# Patient Record
Sex: MALE | Race: BLACK OR AFRICAN AMERICAN | NOT HISPANIC OR LATINO | Employment: STUDENT | URBAN - METROPOLITAN AREA
[De-identification: names, ages, dates, MRNs, and addresses within clinical notes are randomized per-mention and may not be internally consistent; named-entity substitution may affect disease eponyms.]

---
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
## 2024-05-09 ENCOUNTER — OFFICE VISIT (OUTPATIENT)
Dept: URGENT CARE | Facility: CLINIC | Age: 19
End: 2024-05-09
Payer: COMMERCIAL

## 2024-05-09 VITALS
DIASTOLIC BLOOD PRESSURE: 75 MMHG | SYSTOLIC BLOOD PRESSURE: 133 MMHG | BODY MASS INDEX: 25.84 KG/M2 | HEART RATE: 59 BPM | WEIGHT: 175 LBS | OXYGEN SATURATION: 99 %

## 2024-05-09 DIAGNOSIS — H10.12 ALLERGIC CONJUNCTIVITIS OF LEFT EYE: Primary | ICD-10-CM

## 2024-05-09 DIAGNOSIS — Z88.9 HX OF SEASONAL ALLERGIES: ICD-10-CM

## 2024-05-09 PROCEDURE — S9088 SERVICES PROVIDED IN URGENT: HCPCS

## 2024-05-09 PROCEDURE — 99213 OFFICE O/P EST LOW 20 MIN: CPT

## 2024-05-09 RX ORDER — FLUTICASONE PROPIONATE 50 MCG
2 SPRAY, SUSPENSION (ML) NASAL DAILY
Qty: 18.2 ML | Refills: 0 | Status: SHIPPED | OUTPATIENT
Start: 2024-05-09

## 2024-05-09 RX ORDER — AZELASTINE HYDROCHLORIDE 0.5 MG/ML
1 SOLUTION/ DROPS OPHTHALMIC 2 TIMES DAILY PRN
Qty: 6 ML | Refills: 0 | Status: SHIPPED | OUTPATIENT
Start: 2024-05-09

## 2024-05-09 NOTE — PROGRESS NOTES
[facility]        NAME: Geoff Velez is a 19 y.o. male  : 2005    MRN: [id number]  DATE: May 9, 2024  TIME: 7:39 PM    Assessment and Plan   Allergic conjunctivitis of left eye [H10.12]  1. Allergic conjunctivitis of left eye  azelastine (OPTIVAR) 0.05 % ophthalmic solution      2. Hx of seasonal allergies  fluticasone (FLONASE) 50 mcg/act nasal spray        Work note given.     Patient Instructions     Optivar eye drops as needed for itchy eyes.  Flonase nasal spray.  Zyrtec daily.  Cool compresses to eyes.     Follow up with PCP in 3-5 days.  Proceed to the ER with worsening symptoms.     Chief Complaint     Chief Complaint   Patient presents with    Eye Problem     Pt is here for left eye pain and redness. States that he would wake up with crusty eyes for the past 2 days. Needs a doctors note.   He is also asking for a refill on his allergy medication         History of Present Illness       The patient presents today with complaints of intermittent itchy eyes, tearing, crustiness especially to the L eye x a few days. He has a history of seasonal allergies and has been taking zyrtec. Denies fever/chills, cough/congestion. Does not wear contacts.         Review of Systems   Review of Systems   Constitutional:  Negative for chills and fever.   HENT:  Negative for congestion, postnasal drip, rhinorrhea, sinus pressure, sinus pain and sore throat.    Eyes:  Positive for discharge, redness and itching. Negative for photophobia, pain and visual disturbance.        L eye greater than R   Respiratory:  Negative for cough.          Current Medications       Current Outpatient Medications:     azelastine (OPTIVAR) 0.05 % ophthalmic solution, Apply 1 drop to eye 2 (two) times a day as needed (itchy eyes), Disp: 6 mL, Rfl: 0    fluticasone (FLONASE) 50 mcg/act nasal spray, 2 sprays into each nostril daily, Disp: 18.2 mL, Rfl: 0    albuterol (ProAir HFA) 90 mcg/act inhaler, Inhale 2 puffs every 6 (six)  hours as needed for wheezing, Disp: 8.5 g, Rfl: 0    albuterol (PROVENTIL HFA,VENTOLIN HFA) 90 mcg/act inhaler, Inhale 2 puffs every 6 (six) hours as needed for wheezing, Disp: , Rfl:     Cetirizine HCl 10 MG CAPS, Take 1 capsule (10 mg total) by mouth daily, Disp: 30 capsule, Rfl: 0    loratadine (CLARITIN) 10 mg tablet, Take 10 mg by mouth daily (Patient not taking: Reported on 11/30/2022), Disp: , Rfl:     loratadine (CLARITIN) 10 mg tablet, Take 1 tablet (10 mg total) by mouth daily, Disp: 30 tablet, Rfl: 0    neomycin-polymyxin-hydrocortisone (CORTISPORIN) 0.35%-10,000 units/mL-1% ophthalmic suspension, Administer 1 drop to both eyes 3 (three) times a day for 7 days, Disp: 7.5 mL, Rfl: 0    Current Allergies     Allergies as of 05/09/2024    (No Known Allergies)            The following portions of the patient's history were reviewed and updated as appropriate: allergies, current medications, past family history, past medical history, past social history, past surgical history and problem list.     Past Medical History:   Diagnosis Date    Asthma        History reviewed. No pertinent surgical history.    Family History   Problem Relation Age of Onset    Asthma Mother     Asthma Father          Medications have been verified.        Objective   /75   Pulse 59   Wt 79.4 kg (175 lb)   SpO2 99%   BMI 25.84 kg/m²        Physical Exam     Physical Exam  Vitals and nursing note reviewed.   Constitutional:       General: He is not in acute distress.     Appearance: Normal appearance.   HENT:      Head: Normocephalic and atraumatic.      Right Ear: External ear normal.      Left Ear: External ear normal.      Mouth/Throat:      Mouth: Mucous membranes are moist.   Eyes:      Conjunctiva/sclera:      Right eye: Right conjunctiva is not injected. No chemosis, exudate or hemorrhage.     Left eye: Left conjunctiva is not injected. No chemosis, exudate or hemorrhage.     Pupils: Pupils are equal, round, and  reactive to light.   Cardiovascular:      Rate and Rhythm: Normal rate.   Pulmonary:      Effort: Pulmonary effort is normal.   Skin:     General: Skin is warm and dry.   Neurological:      Mental Status: He is alert and oriented to person, place, and time. Mental status is at baseline.   Psychiatric:         Mood and Affect: Mood normal.         Behavior: Behavior normal.

## 2024-05-09 NOTE — PATIENT INSTRUCTIONS
Optivar eye drops as needed for itchy eyes.  Flonase nasal spray.  Zyrtec daily.  Cool compresses to eyes.     Follow up with PCP in 3-5 days.  Proceed to the ER with worsening symptoms.

## 2024-05-09 NOTE — LETTER
May 9, 2024     Patient: Geoff Velez   YOB: 2005   Date of Visit: 5/9/2024       To Whom it May Concern:    Geoff Velez was seen in my clinic on 5/9/2024. He may return to work on 5/10/2024 .    If you have any questions or concerns, please don't hesitate to call.         Sincerely,          WESLEY Wetzel        CC: No Recipients

## 2024-06-07 DIAGNOSIS — Z88.9 HX OF SEASONAL ALLERGIES: ICD-10-CM

## 2024-06-07 RX ORDER — FLUTICASONE PROPIONATE 50 MCG
SPRAY, SUSPENSION (ML) NASAL
Qty: 16 ML | OUTPATIENT
Start: 2024-06-07

## 2024-07-07 ENCOUNTER — HOSPITAL ENCOUNTER (EMERGENCY)
Facility: HOSPITAL | Age: 19
Discharge: HOME | End: 2024-07-07
Attending: EMERGENCY MEDICINE
Payer: COMMERCIAL

## 2024-07-07 VITALS
SYSTOLIC BLOOD PRESSURE: 152 MMHG | RESPIRATION RATE: 18 BRPM | OXYGEN SATURATION: 98 % | HEIGHT: 69 IN | WEIGHT: 161.82 LBS | BODY MASS INDEX: 23.97 KG/M2 | DIASTOLIC BLOOD PRESSURE: 65 MMHG | TEMPERATURE: 98 F | HEART RATE: 58 BPM

## 2024-07-07 DIAGNOSIS — S51.812A FOREARM LACERATION, LEFT, INITIAL ENCOUNTER: Primary | ICD-10-CM

## 2024-07-07 PROCEDURE — 99283 EMERGENCY DEPT VISIT LOW MDM: CPT

## 2024-07-07 PROCEDURE — 90471 IMMUNIZATION ADMIN: CPT

## 2024-07-07 PROCEDURE — 99284 EMERGENCY DEPT VISIT MOD MDM: CPT | Performed by: EMERGENCY MEDICINE

## 2024-07-07 PROCEDURE — 12032 INTMD RPR S/A/T/EXT 2.6-7.5: CPT | Performed by: EMERGENCY MEDICINE

## 2024-07-07 PROCEDURE — 90715 TDAP VACCINE 7 YRS/> IM: CPT | Performed by: EMERGENCY MEDICINE

## 2024-07-07 RX ORDER — CEPHALEXIN 500 MG/1
500 CAPSULE ORAL EVERY 6 HOURS SCHEDULED
Qty: 28 CAPSULE | Refills: 0 | Status: SHIPPED | OUTPATIENT
Start: 2024-07-07 | End: 2024-07-14

## 2024-07-07 RX ORDER — IBUPROFEN 600 MG/1
600 TABLET ORAL ONCE
Status: COMPLETED | OUTPATIENT
Start: 2024-07-07 | End: 2024-07-07

## 2024-07-07 RX ORDER — LIDOCAINE HYDROCHLORIDE AND EPINEPHRINE 10; 10 MG/ML; UG/ML
1 INJECTION, SOLUTION INFILTRATION; PERINEURAL ONCE
Status: COMPLETED | OUTPATIENT
Start: 2024-07-07 | End: 2024-07-07

## 2024-07-07 RX ORDER — BACITRACIN, NEOMYCIN, POLYMYXIN B 400; 3.5; 5 [USP'U]/G; MG/G; [USP'U]/G
1 OINTMENT TOPICAL ONCE
Status: CANCELLED | OUTPATIENT
Start: 2024-07-07 | End: 2024-07-07

## 2024-07-07 RX ORDER — GINSENG 100 MG
1 CAPSULE ORAL ONCE
Status: COMPLETED | OUTPATIENT
Start: 2024-07-07 | End: 2024-07-07

## 2024-07-07 RX ADMIN — TETANUS TOXOID, REDUCED DIPHTHERIA TOXOID AND ACELLULAR PERTUSSIS VACCINE, ADSORBED 0.5 ML: 5; 2.5; 8; 8; 2.5 SUSPENSION INTRAMUSCULAR at 05:44

## 2024-07-07 RX ADMIN — IBUPROFEN 600 MG: 600 TABLET, FILM COATED ORAL at 05:43

## 2024-07-07 RX ADMIN — LIDOCAINE HYDROCHLORIDE,EPINEPHRINE BITARTRATE 1 ML: 10; .01 INJECTION, SOLUTION INFILTRATION; PERINEURAL at 05:45

## 2024-07-07 RX ADMIN — BACITRACIN 1 SMALL APPLICATION: 500 OINTMENT TOPICAL at 05:57

## 2024-07-07 NOTE — Clinical Note
Mook Dickinson was seen and treated in our emergency department on 7/7/2024.                Diagnosis: Laceration R arm    Mook  .    He may return on this date:     Off work 7/7/24  Limited lifting / movement with R arm x 1 week (<15 lbs)     If you have any questions or concerns, please don't hesitate to call.      Keith Gilbert MD    ______________________________           _______________          _______________  Hospital Representative                              Date                                Time

## 2024-07-07 NOTE — DISCHARGE INSTRUCTIONS
Stitches out in 12 days    A  personal message from Dr. Keith Gilbert,  Thank you so much for allowing me to care for you today.    I pride myself in the care and attention I give all my patients.  I hope you were a witness to this tonight.   If for any reason your condition does not improve or worsens, or you have a question that was not answered during your visit you can feel free to text me on my personal phone #  # [phone].   I will answer to your message and continue your care past your emergency room visit.     Please understand that although you are being discharged because your condition has been deemed stable and able to be managed on an outpatient setting. However your condition may worsen as part of the natural progression of the illness/condition, if this occurs please come back to the emergency department for a repeat evaluation.

## 2024-07-08 NOTE — ED PROVIDER NOTES
History  Chief Complaint   Patient presents with    Extremity Laceration     Pt BIBA c/o R arm laceration about 4-5 cm after punching a window during argument with brother around 0330 this morning. Bleeding is currently controlled. Last tetanus vaccine unknown.      Mook Dickinson is a 19 y.o.  year old male  No past medical history on file.    Patient presents with:  Extremity Laceration: Pt BIBA c/o R arm laceration about 4-5 cm after punching a window during argument with brother around 0330 this morning.   Moderate constant pain  Bleeding is currently controlled. Last tetanus vaccine unknown.       History obtained directly from the PATIENT              History provided by:  Patient   used: No        None       No past medical history on file.    No past surgical history on file.    No family history on file.  I have reviewed and agree with the history as documented.    No existing history information found.  No existing history information found.       Review of Systems   Constitutional:  Negative for chills and fever.   HENT:  Negative for ear pain and sore throat.    Eyes:  Negative for pain and visual disturbance.   Respiratory:  Negative for cough and shortness of breath.    Cardiovascular:  Negative for chest pain and palpitations.   Gastrointestinal:  Negative for abdominal pain and vomiting.   Genitourinary:  Negative for dysuria and hematuria.   Musculoskeletal:  Negative for arthralgias and back pain.   Skin:  Positive for wound. Negative for color change and rash.   Neurological:  Negative for seizures and syncope.   All other systems reviewed and are negative.      Physical Exam  Physical Exam  Vitals reviewed.   Constitutional:       General: He is not in acute distress.     Appearance: Normal appearance. He is not ill-appearing.   HENT:      Head: Atraumatic.      Right Ear: External ear normal.      Left Ear: External ear normal.      Mouth/Throat:      Mouth: Mucous membranes  are moist.   Eyes:      Pupils: Pupils are equal, round, and reactive to light.   Cardiovascular:      Rate and Rhythm: Normal rate.   Pulmonary:      Effort: Pulmonary effort is normal.   Skin:     General: Skin is warm and dry.      Comments: Patient has a deep laceration of the forearm 6.2 cm in length.  Distal neurovascular exam is intact.  Function of the digits is normal.  Forearm flexion extension is normal.    Procedure note: Performed by me laceration repair.  After irrigating the wound and numbing with lidocaine with epinephrine by local infiltration.  Wound was explored.  It was deep to the fascia and muscle layers.  The fascia was closed with a continuous stitching with Vicryl 4-0  Then the superficial layers were closed with 2 horizontal mattresses to provide wound with strength and Deivert some of the tension from the edges.  And then interlocking continuous stitches were done on the surface with Ethilon 4-0 x 12.  No complications and tolerated well   Neurological:      General: No focal deficit present.      Mental Status: He is alert and oriented to person, place, and time.   Psychiatric:         Mood and Affect: Mood normal.         Thought Content: Thought content normal.     (RIGHT FOREARM)    Vital Signs  ED Triage Vitals   Temperature Pulse Respirations Blood Pressure SpO2   07/07/24 0506 07/07/24 0506 07/07/24 0506 07/07/24 0506 07/07/24 0506   98 °F (36.7 °C) 58 18 152/65 98 %      Temp Source Heart Rate Source Patient Position - Orthostatic VS BP Location FiO2 (%)   07/07/24 0506 07/07/24 0506 07/07/24 0506 07/07/24 0506 --   Oral Monitor Sitting Left arm       Pain Score       07/07/24 0543       5           Vitals:    07/07/24 0506   BP: 152/65   Pulse: 58   Patient Position - Orthostatic VS: Sitting         Visual Acuity      ED Medications  Medications   lidocaine-epinephrine (XYLOCAINE/EPINEPHRINE) 1 %-1:100,000 injection 1 mL (1 mL Infiltration Given by Other 7/7/24 4545)   ibuprofen  "(MOTRIN) tablet 600 mg (600 mg Oral Given 7/7/24 0543)   tetanus-diphtheria-acellular pertussis (BOOSTRIX) IM injection 0.5 mL (0.5 mL Intramuscular Given 7/7/24 0544)   bacitracin topical ointment 1 small application (1 small application Topical Given 7/7/24 0557)       Diagnostic Studies  Results Reviewed       None                   No orders to display              Procedures  Procedures         ED Course         CRAFFT      Flowsheet Row Most Recent Value   CRAFFT Initial Screen: During the past 12 months, did you:    1. Drink any alcohol (more than a few sips)?  No Filed at: 07/07/2024 0508   2. Smoke any marijuana or hashish No Filed at: 07/07/2024 0508   3. Use anything else to get high? (\"anything else\" includes illegal drugs, over the counter and prescription drugs, and things that you sniff or 's[place]iego')? No Filed at: 07/07/2024 0508                                            Medical Decision Making  Problems Addressed:  Forearm laceration, left, initial encounter: acute illness or injury    Amount and/or Complexity of Data Reviewed  Discussion of management or test interpretation with external provider(s): Patient clinical presentation is benign.    Meaning patient's vital signs are normal and stable ED Triage Vitals  Temperature: 98 °F (36.7 °C) [07/07/24 0506]  Pulse: 58 [07/07/24 0506]  Respirations: 18 [07/07/24 0506]  Blood Pressure: 152/65 [07/07/24 0506]  SpO2: 98 % [07/07/24 0506]  Temp Source: Oral [07/07/24 0506]  Heart Rate Source: Monitor [07/07/24 0506]  Patient Position - Orthostatic VS: Sitting [07/07/24 0506]  BP Location: Left arm [07/07/24 0506]  FiO2 (%): n/a  Pain Score: 5 [07/07/24 0543].    Patient in no distress.    Chief complaint, vital signs, physical examination does not suggest an acute medical emergency at this time.       Risk  OTC drugs.  Prescription drug management.             Disposition  Final diagnoses:   Forearm laceration, left, initial encounter     Time reflects when " diagnosis was documented in both MDM as applicable and the Disposition within this note       Time User Action Codes Description Comment    7/7/2024  5:38 AM Keith Gilbert Add [S51.812A] Forearm laceration, left, initial encounter           ED Disposition       ED Disposition   Discharge    Condition   Stable    Date/Time   Sun Jul 7, 2024 0538    Comment   Mook Alok discharge to home/self care.                   Follow-up Information    None         Discharge Medication List as of 7/7/2024  5:44 AM        START taking these medications    Details   cephalexin (KEFLEX) 500 mg capsule Take 1 capsule (500 mg total) by mouth every 6 (six) hours for 7 days, Starting Sun 7/7/2024, Until Sun 7/14/2024, Print             No discharge procedures on file.    PDMP Review       None            ED Provider  Electronically Signed by             Keith Giblert MD  07/08/24 0009       Keith Gilbert MD  07/09/24 0430

## 2024-10-03 ENCOUNTER — HOSPITAL ENCOUNTER (EMERGENCY)
Facility: HOSPITAL | Age: 19
Discharge: HOME | End: 2024-10-03
Attending: EMERGENCY MEDICINE
Payer: COMMERCIAL

## 2024-10-03 VITALS
SYSTOLIC BLOOD PRESSURE: 122 MMHG | OXYGEN SATURATION: 100 % | DIASTOLIC BLOOD PRESSURE: 62 MMHG | HEART RATE: 83 BPM | HEIGHT: 69 IN | TEMPERATURE: 98 F | BODY MASS INDEX: 23.22 KG/M2 | WEIGHT: 156.75 LBS | RESPIRATION RATE: 18 BRPM

## 2024-10-03 DIAGNOSIS — R09.81 NASAL CONGESTION: ICD-10-CM

## 2024-10-03 DIAGNOSIS — B34.9 ACUTE VIRAL SYNDROME: ICD-10-CM

## 2024-10-03 DIAGNOSIS — R52 GENERALIZED BODY ACHES: ICD-10-CM

## 2024-10-03 DIAGNOSIS — H10.9 CONJUNCTIVITIS, LEFT EYE: Primary | ICD-10-CM

## 2024-10-03 DIAGNOSIS — J34.89 RHINORRHEA: ICD-10-CM

## 2024-10-03 DIAGNOSIS — J02.9 ACUTE SORE THROAT: ICD-10-CM

## 2024-10-03 LAB
FLUAV AG UPPER RESP QL IA.RAPID: NEGATIVE
FLUBV AG UPPER RESP QL IA.RAPID: NEGATIVE
S PYO DNA THROAT QL NAA+PROBE: NOT DETECTED
SARS-COV+SARS-COV-2 AG RESP QL IA.RAPID: NEGATIVE

## 2024-10-03 PROCEDURE — 99284 EMERGENCY DEPT VISIT MOD MDM: CPT | Performed by: EMERGENCY MEDICINE

## 2024-10-03 PROCEDURE — 99283 EMERGENCY DEPT VISIT LOW MDM: CPT

## 2024-10-03 PROCEDURE — 87811 SARS-COV-2 COVID19 W/OPTIC: CPT | Performed by: EMERGENCY MEDICINE

## 2024-10-03 PROCEDURE — 87651 STREP A DNA AMP PROBE: CPT | Performed by: EMERGENCY MEDICINE

## 2024-10-03 PROCEDURE — 87804 INFLUENZA ASSAY W/OPTIC: CPT | Performed by: EMERGENCY MEDICINE

## 2024-10-03 RX ORDER — DEXAMETHASONE 4 MG/1
8 TABLET ORAL ONCE
Qty: 2 TABLET | Refills: 0 | Status: SHIPPED | OUTPATIENT
Start: 2024-10-05 | End: 2024-10-05

## 2024-10-03 RX ORDER — ALBUTEROL SULFATE 90 UG/1
2 INHALANT RESPIRATORY (INHALATION) EVERY 6 HOURS PRN
Qty: 18 G | Refills: 0 | Status: SHIPPED | OUTPATIENT
Start: 2024-10-03

## 2024-10-03 RX ORDER — IBUPROFEN 600 MG/1
600 TABLET, FILM COATED ORAL ONCE
Status: COMPLETED | OUTPATIENT
Start: 2024-10-03 | End: 2024-10-03

## 2024-10-03 RX ORDER — OFLOXACIN 3 MG/ML
2 SOLUTION/ DROPS OPHTHALMIC ONCE
Status: COMPLETED | OUTPATIENT
Start: 2024-10-03 | End: 2024-10-03

## 2024-10-03 RX ADMIN — DEXAMETHASONE SODIUM PHOSPHATE 10 MG: 10 INJECTION, SOLUTION INTRAMUSCULAR; INTRAVENOUS at 21:47

## 2024-10-03 RX ADMIN — OFLOXACIN 2 DROP: 3 SOLUTION/ DROPS OPHTHALMIC at 21:47

## 2024-10-03 RX ADMIN — IBUPROFEN 600 MG: 600 TABLET, FILM COATED ORAL at 21:47

## 2024-10-03 NOTE — Clinical Note
Mook Dickinson was seen and treated in our emergency department on 10/3/2024.                Diagnosis: viral syndrome    Mook  .    He may return on this date: 10/04/2024         If you have any questions or concerns, please don't hesitate to call.      Karri Moreno MD    ______________________________           _______________          _______________  Hospital Representative                              Date                                Time

## 2024-10-03 NOTE — Clinical Note
Mook Dickinson was seen and treated in our emergency department on 10/3/2024.                Diagnosis: viral syndrome    Mook  may return to work on return date.    He may return on this date: 10/06/2024    Symptoms on 10/2 presenting for evaluation and treatment 10/3     If you have any questions or concerns, please don't hesitate to call.      Karri Moreno MD    ______________________________           _______________          _______________  Hospital Representative                              Date                                Time

## 2024-10-04 NOTE — ED PROVIDER NOTES
Final diagnoses:   Conjunctivitis, left eye   Acute viral syndrome   Generalized body aches   Acute sore throat   Nasal congestion   Rhinorrhea     ED Disposition       ED Disposition   Discharge    Condition   Stable    Date/Time   Thu Oct 3, 2024  9:43 PM    Comment   Mook Dickinson discharge to home/self care.                   Assessment & Plan       Medical Decision Making  19-year-old male history of asthma presenting with viral syndrome.  Constellation of symptoms consistent with viral syndrome.  Plan for symptom management with oral medications.  Will provide ocular antibiotic drops for suspected bacterial conjunctivitis.  Work note.  Infectious precautions. Discussed results and recommendations. Advised follow up PCP. Medication recommendations. Given instructions and return precautions. Patient/family at bedside acknowledged understanding of all written and verbal instructions and return precautions. Discharged.     Amount and/or Complexity of Data Reviewed  Labs: ordered.    Risk  Prescription drug management.             Medications   dexamethasone oral liquid 10 mg 1 mL (10 mg Oral Given 10/3/24 2147)   ibuprofen (MOTRIN) tablet 600 mg (600 mg Oral Given 10/3/24 2147)   ofloxacin (OCUFLOX) 0.3 % ophthalmic solution 2 drop (2 drops Left Eye Given 10/3/24 2147)       ED Risk Strat Scores                                               History of Present Illness       Chief Complaint   Patient presents with    Flu Symptoms     Pt reports feeling unwell x 1 week. + generalized body aches and headache. Intermittent nausea. Denies fevers at home. + cough. + sick contacts. + sore throat, pain with swallowing.        Past Medical History:   Diagnosis Date    Asthma       History reviewed. No pertinent surgical history.   History reviewed. No pertinent family history.   Social History     Tobacco Use    Smoking status: Never    Smokeless tobacco: Never   Vaping Use    Vaping status: Every Day   Substance Use Topics     Alcohol use: Yes    Drug use: Yes     Types: Marijuana      E-Cigarette/Vaping    E-Cigarette Use Current Every Day User     Comments vape       E-Cigarette/Vaping Substances      I have reviewed and agree with the history as documented.     19-year-old male history of asthma presenting with viral syndrome.  Patient reports about a week of viral symptoms including nasal congestion, rhinorrhea, sore throat, generalized bodyaches, nonproductive cough.  Patient also reports that his asthma has been acting up and has had to use his inhaler a couple times this week.  Denies any current shortness of breath or wheezing.  Denies any chest pain.  Denies any abdominal pain nausea vomiting diarrhea.  Reports left eye irritation and redness with discharge.  Concern for pinkeye.  Does wear contacts but does not currently wearing them.  Denies any other complaints.  Chart reviewed.    Past Medical History:  No date: Asthma  Family History: non-contributory  Social History          Review of Systems   Constitutional:  Positive for fatigue. Negative for appetite change, chills, diaphoresis, fever and unexpected weight change.   HENT:  Positive for congestion, rhinorrhea and sore throat.    Eyes:  Positive for pain, discharge and redness. Negative for photophobia and visual disturbance.   Respiratory:  Positive for cough. Negative for chest tightness and shortness of breath.    Cardiovascular:  Negative for chest pain, palpitations and leg swelling.   Gastrointestinal:  Negative for abdominal distention, abdominal pain, blood in stool, constipation, diarrhea, nausea and vomiting.   Genitourinary:  Negative for dysuria and hematuria.   Musculoskeletal:  Positive for myalgias. Negative for back pain, joint swelling, neck pain and neck stiffness.   Skin:  Negative for color change, pallor, rash and wound.   Neurological:  Negative for dizziness, syncope, weakness, light-headedness and headaches.   Psychiatric/Behavioral:  Negative  for agitation.    All other systems reviewed and are negative.          Objective       ED Triage Vitals [10/03/24 2050]   Temperature Pulse Blood Pressure Respirations SpO2 Patient Position - Orthostatic VS   98 °F (36.7 °C) 83 122/62 18 100 % Sitting      Temp Source Heart Rate Source BP Location FiO2 (%) Pain Score    Temporal Monitor Left arm -- --      Vitals      Date and Time Temp Pulse SpO2 Resp BP Pain Score FACES Pain Rating User   10/03/24 2050 98 °F (36.7 °C) 83 100 % 18 122/62 -- -- RO            Physical Exam  Vitals and nursing note reviewed.   Constitutional:       General: He is not in acute distress.     Appearance: Normal appearance. He is well-developed. He is ill-appearing. He is not toxic-appearing or diaphoretic.   HENT:      Head: Normocephalic and atraumatic.      Nose: Congestion and rhinorrhea present.      Mouth/Throat:      Mouth: Mucous membranes are moist.      Pharynx: Oropharynx is clear. No oropharyngeal exudate or posterior oropharyngeal erythema.   Eyes:      General: No scleral icterus.        Right eye: No discharge.         Left eye: No discharge.      Extraocular Movements: Extraocular movements intact.      Pupils: Pupils are equal, round, and reactive to light.      Comments: Left eye conjunctival injection   Neck:      Vascular: No JVD.      Trachea: No tracheal deviation.      Comments: Supple. Normal range of motion.   Cardiovascular:      Rate and Rhythm: Normal rate and regular rhythm.      Heart sounds: Normal heart sounds. No murmur heard.     No friction rub. No gallop.      Comments: Normal rate and regular rhythm  Pulmonary:      Effort: Pulmonary effort is normal. No respiratory distress.      Breath sounds: Normal breath sounds. No stridor. No wheezing or rales.      Comments: Clear to auscultation bilaterally  Chest:      Chest wall: No tenderness.   Abdominal:      General: Bowel sounds are normal. There is no distension.      Palpations: Abdomen is soft.       Tenderness: There is no abdominal tenderness. There is no right CVA tenderness, left CVA tenderness, guarding or rebound.      Comments: Soft, nontender, nondistended.  Normal bowel sounds throughout   Musculoskeletal:         General: No swelling, tenderness, deformity or signs of injury. Normal range of motion.      Cervical back: Normal range of motion and neck supple. No rigidity. No muscular tenderness.      Right lower leg: No edema.      Left lower leg: No edema.   Lymphadenopathy:      Cervical: No cervical adenopathy.   Skin:     General: Skin is warm and dry.      Coloration: Skin is not pale.      Findings: No erythema or rash.   Neurological:      General: No focal deficit present.      Mental Status: He is alert. Mental status is at baseline.      Sensory: No sensory deficit.      Motor: No weakness or abnormal muscle tone.      Coordination: Coordination normal.      Gait: Gait normal.      Comments: Alert.  Strength and sensation grossly intact.  Ambulatory without difficulty at baseline.    Psychiatric:         Behavior: Behavior normal.         Thought Content: Thought content normal.         Results Reviewed       Procedure Component Value Units Date/Time    Strep A PCR [[id number]]  (Normal) Collected: 10/03/24 2054    Lab Status: Final result Specimen: Throat Updated: 10/03/24 2124     STREP A PCR Not Detected    FLU/COVID Rapid Antigen (30 min. TAT) - Preferred screening test in ED [[id number]]  (Normal) Collected: 10/03/24 2054    Lab Status: Final result Specimen: Nares from Nose Updated: 10/03/24 2116     SARS COV Rapid Antigen Negative     Influenza A Rapid Antigen Negative     Influenza B Rapid Antigen Negative    Narrative:      This test has been performed using the Quidel Kayy 2 FLU+SARS Antigen test under the Emergency Use Authorization (EUA). This test has been validated by the  and verified by the performing laboratory. The Kayy uses lateral flow immunofluorescent sandwich  assay to detect SARS-COV, Influenza A and Influenza B Antigen.     The Quidel Kayy 2 SARS Antigen test does not differentiate between SARS-CoV and SARS-CoV-2.     Negative results are presumptive and may be confirmed with a molecular assay, if necessary, for patient management. Negative results do not rule out SARS-CoV-2 or influenza infection and should not be used as the sole basis for treatment or patient management decisions. A negative test result may occur if the level of antigen in a sample is below the limit of detection of this test.     Positive results are indicative of the presence of viral antigens, but do not rule out bacterial infection or co-infection with other viruses.     All test results should be used as an adjunct to clinical observations and other information available to the provider.    FOR PEDIATRIC PATIENTS - copy/paste COVID Guidelines URL to browser: [email or web address]            No orders to display       Procedures    ED Medication and Procedure Management   None     Discharge Medication List as of 10/3/2024 10:04 PM        START taking these medications    Details   albuterol (ProAir HFA) 90 mcg/act inhaler Inhale 2 puffs every 6 (six) hours as needed for shortness of breath or wheezing, Starting Thu 10/3/2024, Normal      dexamethasone (DECADRON) 4 mg tablet Take 2 tablets (8 mg total) by mouth 1 (one) time for 1 dose Do not start before October 5, 2024., Starting Sat 10/5/2024, Normal           No discharge procedures on file.  ED SEPSIS DOCUMENTATION   Time reflects when diagnosis was documented in both MDM as applicable and the Disposition within this note       Time User Action Codes Description Comment    10/3/2024  9:44 PM ErneKarri Add [H10.9] Conjunctivitis, left eye     10/3/2024  9:44 PM Erne Karri Add [B34.9] Acute viral syndrome     10/3/2024  9:45 PM ErnePhann Add [R52] Generalized body aches     10/3/2024  10:17 PM Karri Moreno [J02.9] Acute sore throat     10/3/2024 10:17 PM Karri Moreno [R09.81] Nasal congestion     10/3/2024 10:17 PM Karri Moreno [J34.89] Rhinorrhea                  Karri Moreno MD  10/03/24 2216       Karri Moreno MD  10/03/24 2217

## 2024-10-18 ENCOUNTER — APPOINTMENT (OUTPATIENT)
Dept: RADIOLOGY | Facility: CLINIC | Age: 19
End: 2024-10-18
Payer: COMMERCIAL

## 2024-10-18 ENCOUNTER — OFFICE VISIT (OUTPATIENT)
Dept: URGENT CARE | Facility: CLINIC | Age: 19
End: 2024-10-18
Payer: COMMERCIAL

## 2024-10-18 VITALS
TEMPERATURE: 97.6 F | SYSTOLIC BLOOD PRESSURE: 147 MMHG | RESPIRATION RATE: 18 BRPM | OXYGEN SATURATION: 96 % | DIASTOLIC BLOOD PRESSURE: 75 MMHG | HEART RATE: 96 BPM | BODY MASS INDEX: 22.77 KG/M2 | WEIGHT: 154.2 LBS

## 2024-10-18 DIAGNOSIS — R06.02 SOB (SHORTNESS OF BREATH): ICD-10-CM

## 2024-10-18 DIAGNOSIS — H65.02 NON-RECURRENT ACUTE SEROUS OTITIS MEDIA OF LEFT EAR: ICD-10-CM

## 2024-10-18 DIAGNOSIS — J45.41 MODERATE PERSISTENT ASTHMA WITH EXACERBATION: Primary | ICD-10-CM

## 2024-10-18 PROCEDURE — 99214 OFFICE O/P EST MOD 30 MIN: CPT | Performed by: NURSE PRACTITIONER

## 2024-10-18 PROCEDURE — S9088 SERVICES PROVIDED IN URGENT: HCPCS | Performed by: NURSE PRACTITIONER

## 2024-10-18 PROCEDURE — 71046 X-RAY EXAM CHEST 2 VIEWS: CPT

## 2024-10-18 RX ORDER — PREDNISONE 20 MG/1
40 TABLET ORAL DAILY
Qty: 10 TABLET | Refills: 0 | Status: SHIPPED | OUTPATIENT
Start: 2024-10-18 | End: 2024-10-23

## 2024-10-18 NOTE — LETTER
October 18, 2024     Patient: Geoff Velez   YOB: 2005   Date of Visit: 10/18/2024       To Whom it May Concern:    Geoff Velez was seen in my clinic on 10/18/2024. He may return to work on 10/22/2024 .    If you have any questions or concerns, please don't hesitate to call.         Sincerely,          WESLEY Hutchinson        CC: No Recipients

## 2024-10-18 NOTE — PROGRESS NOTES
[facility]        NAME: Geoff Velez is a 19 y.o. male  : 2005    MRN: [id number]  DATE: 2024  TIME: 12:43 PM    Assessment and Plan   Moderate persistent asthma with exacerbation [J45.41]  1. Moderate persistent asthma with exacerbation  predniSONE 20 mg tablet      2. SOB (shortness of breath)  XR chest pa and lateral      3. Non-recurrent acute serous otitis media of left ear  amoxicillin-clavulanate (AUGMENTIN) 875-125 mg per tablet        Xray reviewed and no acute pneumonia was noted. Pending final read from radiology.  Advised patient he needs to take his Flovent inhaler daily as directed as this is the maintenance medication to prevent flare ups. Albuterol inhaler should be used as needed only. Will treat with PO prednisone x 5 days. Also start Augmentin as directed for otitis media of left ear. Follow up with PCP for asthma control.     Patient Instructions       Follow up with PCP in 3-5 days.  Proceed to  ER if symptoms worsen.    If tests are performed, our office will contact you with results only if changes need to made to the care plan discussed with you at the visit. [facility]    Chief Complaint     Chief Complaint   Patient presents with    Cough     Patient here with cough, chest tightness and right ear pain for a few days now. Mother states this is an intermittent issue so she is requesting him to get a chest xray.          History of Present Illness       Was seen at urgent care one month ago and treated with oral steroids. Has used nebulizer treatments intermittently, using albuterol inhaler about 3 times per week. Not taking any allergy medication. Mom states he has Flovent inhaler but does not use. Mom would like a chest xray as this has been an ongoing problem for a month or so.     Asthma  He complains of chest tightness, cough, difficulty breathing, shortness of breath and wheezing. There is no frequent throat  clearing, hemoptysis, hoarse voice or sputum production. This is a recurrent problem. The current episode started more than 1 month ago. The problem occurs constantly. The problem has been waxing and waning. The cough is non-productive. Associated symptoms include dyspnea on exertion, ear congestion, ear pain, myalgias and a sore throat. Pertinent negatives include no appetite change, chest pain, fever, headaches, heartburn, malaise/fatigue, nasal congestion, orthopnea, PND, postnasal drip, rhinorrhea, sneezing, sweats, trouble swallowing or weight loss. His past medical history is significant for asthma.       Review of Systems   Review of Systems   Constitutional:  Negative for appetite change, fatigue, fever, malaise/fatigue and weight loss.   HENT:  Positive for ear pain and sore throat. Negative for hoarse voice, postnasal drip, rhinorrhea, sneezing and trouble swallowing.    Respiratory:  Positive for cough, shortness of breath and wheezing. Negative for hemoptysis and sputum production.    Cardiovascular:  Positive for dyspnea on exertion. Negative for chest pain and PND.   Gastrointestinal:  Negative for heartburn.   Musculoskeletal:  Positive for myalgias.   Neurological:  Negative for dizziness and headaches.         Current Medications       Current Outpatient Medications:     albuterol (ProAir HFA) 90 mcg/act inhaler, Inhale 2 puffs every 6 (six) hours as needed for wheezing, Disp: 8.5 g, Rfl: 0    amoxicillin-clavulanate (AUGMENTIN) 875-125 mg per tablet, Take 1 tablet by mouth every 12 (twelve) hours for 7 days, Disp: 14 tablet, Rfl: 0    azelastine (OPTIVAR) 0.05 % ophthalmic solution, Apply 1 drop to eye 2 (two) times a day as needed (itchy eyes), Disp: 6 mL, Rfl: 0    Cetirizine HCl 10 MG CAPS, Take 1 capsule (10 mg total) by mouth daily, Disp: 30 capsule, Rfl: 0    fluticasone (FLONASE) 50 mcg/act nasal spray, 2 sprays into each nostril daily, Disp: 18.2 mL, Rfl: 0    loratadine (CLARITIN) 10 mg  tablet, Take 1 tablet (10 mg total) by mouth daily, Disp: 30 tablet, Rfl: 0    predniSONE 20 mg tablet, Take 2 tablets (40 mg total) by mouth daily for 5 days, Disp: 10 tablet, Rfl: 0    albuterol (PROVENTIL HFA,VENTOLIN HFA) 90 mcg/act inhaler, Inhale 2 puffs every 6 (six) hours as needed for wheezing, Disp: , Rfl:     neomycin-polymyxin-hydrocortisone (CORTISPORIN) 0.35%-10,000 units/mL-1% ophthalmic suspension, Administer 1 drop to both eyes 3 (three) times a day for 7 days, Disp: 7.5 mL, Rfl: 0    Current Allergies     Allergies as of 10/18/2024    (No Known Allergies)            The following portions of the patient's history were reviewed and updated as appropriate: allergies, current medications, past family history, past medical history, past social history, past surgical history and problem list.     Past Medical History:   Diagnosis Date    Asthma        History reviewed. No pertinent surgical history.    Family History   Problem Relation Age of Onset    Asthma Mother     Asthma Father          Medications have been verified.        Objective   /75   Pulse 96   Temp 97.6 °F (36.4 °C)   Resp 18   Wt 69.9 kg (154 lb 3.2 oz)   SpO2 96%   BMI 22.77 kg/m²        Physical Exam     Physical Exam  Vitals and nursing note reviewed.   Constitutional:       General: He is not in acute distress.     Appearance: Normal appearance. He is not ill-appearing.   HENT:      Head: Normocephalic and atraumatic.      Right Ear: Hearing and external ear normal.      Left Ear: Hearing and external ear normal. Tympanic membrane is injected and erythematous.      Nose: Rhinorrhea present.      Right Sinus: Maxillary sinus tenderness present. No frontal sinus tenderness.      Left Sinus: Maxillary sinus tenderness present. No frontal sinus tenderness.      Mouth/Throat:      Pharynx: Posterior oropharyngeal erythema present. No oropharyngeal exudate.   Eyes:      Conjunctiva/sclera: Conjunctivae normal.      Pupils:  Pupils are equal, round, and reactive to light.   Cardiovascular:      Rate and Rhythm: Normal rate and regular rhythm.      Heart sounds: Normal heart sounds, S1 normal and S2 normal.   Pulmonary:      Effort: Pulmonary effort is normal. No accessory muscle usage.      Breath sounds: Normal breath sounds. No wheezing.   Skin:     General: Skin is warm and dry.      Capillary Refill: Capillary refill takes less than 2 seconds.      Findings: No rash.   Neurological:      General: No focal deficit present.      Mental Status: He is alert and oriented to person, place, and time.      Motor: Motor function is intact.   Psychiatric:         Attention and Perception: Attention and perception normal.         Mood and Affect: Mood and affect normal.         Speech: Speech normal.         Behavior: Behavior normal. Behavior is cooperative.         Thought Content: Thought content normal.

## 2025-01-28 ENCOUNTER — APPOINTMENT (EMERGENCY)
Dept: CT IMAGING | Facility: HOSPITAL | Age: 20
End: 2025-01-28
Payer: COMMERCIAL

## 2025-01-28 ENCOUNTER — HOSPITAL ENCOUNTER (EMERGENCY)
Facility: HOSPITAL | Age: 20
Discharge: HOME | End: 2025-01-28
Payer: COMMERCIAL

## 2025-01-28 VITALS
TEMPERATURE: 97.8 F | BODY MASS INDEX: 22.22 KG/M2 | SYSTOLIC BLOOD PRESSURE: 136 MMHG | HEIGHT: 69 IN | OXYGEN SATURATION: 99 % | DIASTOLIC BLOOD PRESSURE: 77 MMHG | HEART RATE: 80 BPM | RESPIRATION RATE: 18 BRPM | WEIGHT: 150 LBS

## 2025-01-28 DIAGNOSIS — R10.11 RUQ PAIN: ICD-10-CM

## 2025-01-28 DIAGNOSIS — R10.9 ABDOMINAL PAIN: Primary | ICD-10-CM

## 2025-01-28 LAB
ALBUMIN SERPL BCG-MCNC: 4.6 G/DL (ref 3.5–5)
ALP SERPL-CCNC: 45 U/L (ref 34–104)
ALT SERPL W P-5'-P-CCNC: 19 U/L (ref 7–52)
ANION GAP SERPL CALCULATED.3IONS-SCNC: 5 MMOL/L (ref 4–13)
AST SERPL W P-5'-P-CCNC: 21 U/L (ref 13–39)
BASOPHILS # BLD AUTO: 0.05 THOUSANDS/ΜL (ref 0–0.1)
BASOPHILS NFR BLD AUTO: 1 % (ref 0–1)
BILIRUB SERPL-MCNC: 1.16 MG/DL (ref 0.2–1)
BILIRUB UR QL STRIP: NEGATIVE
BUN SERPL-MCNC: 11 MG/DL (ref 5–25)
CALCIUM SERPL-MCNC: 9.1 MG/DL (ref 8.4–10.2)
CHLORIDE SERPL-SCNC: 105 MMOL/L (ref 96–108)
CLARITY UR: CLEAR
CO2 SERPL-SCNC: 27 MMOL/L (ref 21–32)
COLOR UR: NORMAL
CREAT SERPL-MCNC: 0.9 MG/DL (ref 0.6–1.3)
EOSINOPHIL # BLD AUTO: 0.16 THOUSAND/ΜL (ref 0–0.61)
EOSINOPHIL NFR BLD AUTO: 4 % (ref 0–6)
ERYTHROCYTE [DISTWIDTH] IN BLOOD BY AUTOMATED COUNT: 11.4 % (ref 11.6–15.1)
GFR SERPL CREATININE-BSD FRML MDRD: 122 ML/MIN/1.73SQ M
GLUCOSE SERPL-MCNC: 79 MG/DL (ref 65–140)
GLUCOSE UR STRIP-MCNC: NEGATIVE MG/DL
HCT VFR BLD AUTO: 41.8 % (ref 36.5–49.3)
HGB BLD-MCNC: 13.7 G/DL (ref 12–17)
HGB UR QL STRIP.AUTO: NEGATIVE
IMM GRANULOCYTES # BLD AUTO: 0.01 THOUSAND/UL (ref 0–0.2)
IMM GRANULOCYTES NFR BLD AUTO: 0 % (ref 0–2)
KETONES UR STRIP-MCNC: NEGATIVE MG/DL
LEUKOCYTE ESTERASE UR QL STRIP: NEGATIVE
LIPASE SERPL-CCNC: 19 U/L (ref 11–82)
LYMPHOCYTES # BLD AUTO: 1.65 THOUSANDS/ΜL (ref 0.6–4.47)
LYMPHOCYTES NFR BLD AUTO: 36 % (ref 14–44)
MCH RBC QN AUTO: 28.8 PG (ref 26.8–34.3)
MCHC RBC AUTO-ENTMCNC: 32.8 G/DL (ref 31.4–37.4)
MCV RBC AUTO: 88 FL (ref 82–98)
MONOCYTES # BLD AUTO: 0.49 THOUSAND/ΜL (ref 0.17–1.22)
MONOCYTES NFR BLD AUTO: 11 % (ref 4–12)
NEUTROPHILS # BLD AUTO: 2.26 THOUSANDS/ΜL (ref 1.85–7.62)
NEUTS SEG NFR BLD AUTO: 48 % (ref 43–75)
NITRITE UR QL STRIP: NEGATIVE
NRBC BLD AUTO-RTO: 0 /100 WBCS
PH UR STRIP.AUTO: 7 [PH]
PLATELET # BLD AUTO: 263 THOUSANDS/UL (ref 149–390)
PMV BLD AUTO: 11.6 FL (ref 8.9–12.7)
POTASSIUM SERPL-SCNC: 4.3 MMOL/L (ref 3.5–5.3)
PROT SERPL-MCNC: 7.6 G/DL (ref 6.4–8.4)
PROT UR STRIP-MCNC: NEGATIVE MG/DL
RBC # BLD AUTO: 4.75 MILLION/UL (ref 3.88–5.62)
SODIUM SERPL-SCNC: 137 MMOL/L (ref 135–147)
SP GR UR STRIP.AUTO: 1.02 (ref 1–1.03)
UROBILINOGEN UR STRIP-ACNC: <2 MG/DL
WBC # BLD AUTO: 4.62 THOUSAND/UL (ref 4.31–10.16)

## 2025-01-28 PROCEDURE — 74177 CT ABD & PELVIS W/CONTRAST: CPT

## 2025-01-28 PROCEDURE — 80053 COMPREHEN METABOLIC PANEL: CPT

## 2025-01-28 PROCEDURE — 99284 EMERGENCY DEPT VISIT MOD MDM: CPT

## 2025-01-28 PROCEDURE — 96374 THER/PROPH/DIAG INJ IV PUSH: CPT

## 2025-01-28 PROCEDURE — 87086 URINE CULTURE/COLONY COUNT: CPT

## 2025-01-28 PROCEDURE — 96361 HYDRATE IV INFUSION ADD-ON: CPT

## 2025-01-28 PROCEDURE — 85025 COMPLETE CBC W/AUTO DIFF WBC: CPT

## 2025-01-28 PROCEDURE — 83690 ASSAY OF LIPASE: CPT

## 2025-01-28 PROCEDURE — 81003 URINALYSIS AUTO W/O SCOPE: CPT

## 2025-01-28 PROCEDURE — 36415 COLL VENOUS BLD VENIPUNCTURE: CPT

## 2025-01-28 RX ORDER — KETOROLAC TROMETHAMINE 30 MG/ML
15 INJECTION, SOLUTION INTRAMUSCULAR; INTRAVENOUS ONCE
Status: COMPLETED | OUTPATIENT
Start: 2025-01-28 | End: 2025-01-28

## 2025-01-28 RX ADMIN — KETOROLAC TROMETHAMINE 15 MG: 30 INJECTION, SOLUTION INTRAMUSCULAR; INTRAVENOUS at 21:10

## 2025-01-28 RX ADMIN — SODIUM CHLORIDE 1000 ML: 0.9 INJECTION, SOLUTION INTRAVENOUS at 21:10

## 2025-01-28 RX ADMIN — IOHEXOL 100 ML: 350 INJECTION, SOLUTION INTRAVENOUS at 21:08

## 2025-01-28 NOTE — Clinical Note
Geoff Velez was seen and treated in our emergency department on 1/28/2025.                Diagnosis:     Geoff  may return to work on return date, is off the rest of the shift today.    He may return on this date: 01/30/2025         If you have any questions or concerns, please don't hesitate to call.      Ashley Truong PA-C    ______________________________           _______________          _______________  Hospital Representative                              Date                                Time

## 2025-01-29 NOTE — DISCHARGE INSTRUCTIONS
IMPRESSION:     Evaluation of bowel somewhat limited due to lack of oral contrast and intra-abdominal fat planes.     Trace pelvic free fluid. This is a nonspecific finding however abnormal in a male patient.     Urinary bladder wall appears mildly thickened which may be due to underdistention. Correlation with urinalysis is recommended.     Focal hypodensity along the hepatic fissure may represent focal fatty infiltration, however incompletely characterized. Further evaluation with nonemergent abdominal MRI after resolution of acute symptoms is recommended.       Follow-up with GI, referral placed.  Follow-up with your PCP and return to the ER for any worsening symptoms.

## 2025-01-29 NOTE — ED PROVIDER NOTES
Time reflects when diagnosis was documented in both MDM as applicable and the Disposition within this note       Time User Action Codes Description Comment    1/28/2025 10:03 PM Ashley Truong Add [R10.9] Abdominal pain     1/28/2025 10:03 PM Ashley Truong Add [R10.11] RUQ pain           ED Disposition       ED Disposition   Discharge    Condition   Stable    Date/Time   Tue Jan 28, 2025 10:03 PM    Comment   Geoff Velez discharge to home/self care.                   Assessment & Plan       Medical Decision Making  Vital signs stable and patient well-appearing throughout ER course.  Mildly elevated total bilirubin, otherwise, labs stable.  UA stable.  CT scan with trace pelvic free fluid, focal hypodensity along hepatic fissure-recommended nonemergent abdominal MRI.  Additionally, questionable cystitis.  As UA unremarkable patient does not have urinary symptoms, will hold on treating with antibiotics for now.  Urine culture sent.  Improved symptoms after medications and fluids in the ER.  Provided patient education and discussed return precautions.  Patient to follow-up with GI, referral placed.  Patient to follow-up with his PCP and return to the ER for any worsening symptoms.  Patient verbalizes understanding and agrees to discharge plan.  Patient was provided with written discharge instructions.    Amount and/or Complexity of Data Reviewed  Labs: ordered. Decision-making details documented in ED Course.  Radiology: ordered. Decision-making details documented in ED Course.    Risk  Prescription drug management.        ED Course as of 01/29/25 0456   Tue Jan 28, 2025   2202 CT abdomen pelvis with contrast  IMPRESSION:     Evaluation of bowel somewhat limited due to lack of oral contrast and intra-abdominal fat planes.     Trace pelvic free fluid. This is a nonspecific finding however abnormal in a male patient.     Urinary bladder wall appears mildly thickened which may be due to underdistention. Correlation with  "urinalysis is recommended.     Focal hypodensity along the hepatic fissure may represent focal fatty infiltration, however incompletely characterized. Further evaluation with nonemergent abdominal MRI after resolution of acute symptoms is recommended.     2202 Leukocytes, UA: Negative   2202 Nitrite, UA: Negative       Medications   sodium chloride 0.9 % bolus 1,000 mL (0 mL Intravenous Stopped 1/28/25 2231)   ketorolac (TORADOL) injection 15 mg (15 mg Intravenous Given 1/28/25 2110)   iohexol (OMNIPAQUE) 350 MG/ML injection (MULTI-DOSE) 100 mL (100 mL Intravenous Given 1/28/25 2108)       ED Risk Strat Scores            CRAFFT      Flowsheet Row Most Recent Value   CRAFFT Initial Screen: During the past 12 months, did you:    1. Drink any alcohol (more than a few sips)?  No Filed at: 01/28/2025 2010   2. Smoke any marijuana or hashish No Filed at: 01/28/2025 2010   3. Use anything else to get high? (\"anything else\" includes illegal drugs, over the counter and prescription drugs, and things that you sniff or 'v[place]')? No Filed at: 01/28/2025 2010                                          History of Present Illness       Chief Complaint   Patient presents with    Abdominal Pain     Abd cramping x2 weeks, intermittent, reports \"bathroom issues.\" Pain is now localized to the RLQ and is \"shooting and wakes me up.\"        Past Medical History:   Diagnosis Date    Asthma       History reviewed. No pertinent surgical history.   History reviewed. No pertinent family history.   Social History     Tobacco Use    Smoking status: Never    Smokeless tobacco: Never   Vaping Use    Vaping status: Every Day    Substances: Nicotine   Substance Use Topics    Alcohol use: Yes    Drug use: Yes     Types: Marijuana      E-Cigarette/Vaping    E-Cigarette Use Current Every Day User     Comments vape       E-Cigarette/Vaping Substances    Nicotine Yes       I have reviewed and agree with the history as documented.     Patient is a " 19-year-old male who presents to the emergency room for abdominal pain.  Patient reports abdominal cramping for x2 weeks.  Symptoms are on and off.  Associated shooting pain to his RUQ.  Reports pain wakes him up from sleep.  Denies any other symptoms.  No medication for symptoms.          Review of Systems   Constitutional:  Negative for chills and fever.   HENT:  Negative for ear pain, sore throat, trouble swallowing and voice change.    Eyes:  Negative for pain and visual disturbance.   Respiratory:  Negative for cough and shortness of breath.    Cardiovascular:  Negative for chest pain and palpitations.   Gastrointestinal:  Positive for abdominal pain. Negative for constipation, diarrhea, nausea and vomiting.   Genitourinary:  Negative for dysuria, flank pain and hematuria.   Musculoskeletal:  Negative for arthralgias and back pain.   Skin:  Negative for color change and rash.   Neurological:  Negative for seizures, syncope and headaches.   Psychiatric/Behavioral:  Negative for confusion.    All other systems reviewed and are negative.          Objective       ED Triage Vitals   Temperature Pulse Blood Pressure Respirations SpO2 Patient Position - Orthostatic VS   01/28/25 2008 01/28/25 2008 01/28/25 2008 01/28/25 2008 01/28/25 2008 01/28/25 2008   97.8 °F (36.6 °C) 80 136/77 18 99 % Sitting      Temp Source Heart Rate Source BP Location FiO2 (%) Pain Score    01/28/25 2008 01/28/25 2008 01/28/25 2008 -- 01/28/25 2010    Temporal Monitor Left arm  No Pain      Vitals      Date and Time Temp Pulse SpO2 Resp BP Pain Score FACES Pain Rating User   01/28/25 2010 -- -- -- -- -- No Pain -- VB   01/28/25 2008 97.8 °F (36.6 °C) 80 99 % 18 136/77 -- -- RO            Physical Exam  Vitals and nursing note reviewed.   Constitutional:       General: He is not in acute distress.     Appearance: Normal appearance. He is well-developed.   HENT:      Head: Normocephalic and atraumatic.   Eyes:      Conjunctiva/sclera:  Conjunctivae normal.   Cardiovascular:      Rate and Rhythm: Normal rate and regular rhythm.      Pulses: Normal pulses.      Heart sounds: No murmur heard.  Pulmonary:      Effort: Pulmonary effort is normal. No respiratory distress.      Breath sounds: Normal breath sounds.   Abdominal:      Palpations: Abdomen is soft.      Tenderness: There is abdominal tenderness in the right upper quadrant.   Musculoskeletal:         General: No swelling.      Cervical back: Neck supple.   Skin:     General: Skin is warm and dry.      Capillary Refill: Capillary refill takes less than 2 seconds.   Neurological:      General: No focal deficit present.      Mental Status: He is alert and oriented to person, place, and time.   Psychiatric:         Mood and Affect: Mood normal.         Results Reviewed       Procedure Component Value Units Date/Time    Urine culture [[id number]] Collected: 01/28/25 2218    Lab Status: In process Specimen: Urine, Clean Catch Updated: 01/28/25 2218    UA w Reflex to Microscopic w Reflex to Culture [[id number]] Collected: 01/28/25 2050    Lab Status: Final result Specimen: Urine, Other Updated: 01/28/25 2108     Color, UA Light Yellow     Clarity, UA Clear     Specific Gravity, UA 1.020     pH, UA 7.0     Leukocytes, UA Negative     Nitrite, UA Negative     Protein, UA Negative mg/dl      Glucose, UA Negative mg/dl      Ketones, UA Negative mg/dl      Urobilinogen, UA <2.0 mg/dl      Bilirubin, UA Negative     Occult Blood, UA Negative    Comprehensive metabolic panel [[id number]]  (Abnormal) Collected: 01/28/25 2035    Lab Status: Final result Specimen: Blood from Arm, Right Updated: 01/28/25 2056     Sodium 137 mmol/L      Potassium 4.3 mmol/L      Chloride 105 mmol/L      CO2 27 mmol/L      ANION GAP 5 mmol/L      BUN 11 mg/dL      Creatinine 0.90 mg/dL      Glucose 79 mg/dL      Calcium 9.1 mg/dL      AST 21 U/L      ALT 19 U/L      Alkaline Phosphatase 45 U/L      Total Protein 7.6 g/dL       Albumin 4.6 g/dL      Total Bilirubin 1.16 mg/dL      eGFR 122 ml/min/1.73sq m     Narrative:      [employer] guidelines for Chronic Kidney Disease (CKD):     Stage 1 with normal or high GFR (GFR > 90 mL/min/1.73 square meters)    Stage 2 Mild CKD (GFR = 60-89 mL/min/1.73 square meters)    Stage 3A Moderate CKD (GFR = 45-59 mL/min/1.73 square meters)    Stage 3B Moderate CKD (GFR = 30-44 mL/min/1.73 square meters)    Stage 4 Severe CKD (GFR = 15-29 mL/min/1.73 square meters)    Stage 5 End Stage CKD (GFR <15 mL/min/1.73 square meters)  Note: GFR calculation is accurate only with a steady state creatinine    Lipase [[id number]]  (Normal) Collected: 01/28/25 2035    Lab Status: Final result Specimen: Blood from Arm, Right Updated: 01/28/25 2056     Lipase 19 u/L     CBC and differential [[id number]]  (Abnormal) Collected: 01/28/25 2035    Lab Status: Final result Specimen: Blood from Arm, Right Updated: 01/28/25 2040     WBC 4.62 Thousand/uL      RBC 4.75 Million/uL      Hemoglobin 13.7 g/dL      Hematocrit 41.8 %      MCV 88 fL      MCH 28.8 pg      MCHC 32.8 g/dL      RDW 11.4 %      MPV 11.6 fL      Platelets 263 Thousands/uL      nRBC 0 /100 WBCs      Segmented % 48 %      Immature Grans % 0 %      Lymphocytes % 36 %      Monocytes % 11 %      Eosinophils Relative 4 %      Basophils Relative 1 %      Absolute Neutrophils 2.26 Thousands/µL      Absolute Immature Grans 0.01 Thousand/uL      Absolute Lymphocytes 1.65 Thousands/µL      Absolute Monocytes 0.49 Thousand/µL      Eosinophils Absolute 0.16 Thousand/µL      Basophils Absolute 0.05 Thousands/µL             CT abdomen pelvis with contrast   Final Interpretation by Elmo Quinonez MD (01/28 2153)      Evaluation of bowel somewhat limited due to lack of oral contrast and intra-abdominal fat planes.      Trace pelvic free fluid. This is a nonspecific finding however abnormal in a male patient.      Urinary bladder wall appears mildly  thickened which may be due to underdistention. Correlation with urinalysis is recommended.      Focal hypodensity along the hepatic fissure may represent focal fatty infiltration, however incompletely characterized. Further evaluation with nonemergent abdominal MRI after resolution of acute symptoms is recommended.      The study was marked in EPIC for immediate notification.         Workstation performed: [id number]             Procedures    ED Medication and Procedure Management   Prior to Admission Medications   Prescriptions Last Dose Informant Patient Reported? Taking?   albuterol (ProAir HFA) 90 mcg/act inhaler   No No   Sig: Inhale 2 puffs every 6 (six) hours as needed for shortness of breath or wheezing      Facility-Administered Medications: None     Discharge Medication List as of 1/28/2025 10:05 PM        CONTINUE these medications which have NOT CHANGED    Details   albuterol (ProAir HFA) 90 mcg/act inhaler Inhale 2 puffs every 6 (six) hours as needed for shortness of breath or wheezing, Starting Thu 10/3/2024, Normal             ED SEPSIS DOCUMENTATION   Time reflects when diagnosis was documented in both MDM as applicable and the Disposition within this note       Time User Action Codes Description Comment    1/28/2025 10:03 PM Ashley Truong [R10.9] Abdominal pain     1/28/2025 10:03 PM Ashley Truong [R10.11] RUQ pain                  Ashley Truong PA-C  01/29/25 0456

## 2025-01-30 LAB — BACTERIA UR CULT: NORMAL

## 2025-02-03 ENCOUNTER — HOSPITAL ENCOUNTER (EMERGENCY)
Facility: HOSPITAL | Age: 20
Discharge: HOME | End: 2025-02-04
Attending: EMERGENCY MEDICINE | Admitting: EMERGENCY MEDICINE
Payer: COMMERCIAL

## 2025-02-03 DIAGNOSIS — R82.90 ABNORMAL URINALYSIS: ICD-10-CM

## 2025-02-03 DIAGNOSIS — R10.9 ABDOMINAL PAIN: Primary | ICD-10-CM

## 2025-02-03 LAB
ALBUMIN SERPL BCG-MCNC: 4.3 G/DL (ref 3.5–5)
ALP SERPL-CCNC: 47 U/L (ref 34–104)
ALT SERPL W P-5'-P-CCNC: 18 U/L (ref 7–52)
ANION GAP SERPL CALCULATED.3IONS-SCNC: 5 MMOL/L (ref 4–13)
AST SERPL W P-5'-P-CCNC: 19 U/L (ref 13–39)
BACTERIA UR QL AUTO: ABNORMAL /HPF
BASOPHILS # BLD MANUAL: 0 THOUSAND/UL (ref 0–0.1)
BASOPHILS NFR MAR MANUAL: 0 % (ref 0–1)
BILIRUB SERPL-MCNC: 0.68 MG/DL (ref 0.2–1)
BILIRUB UR QL STRIP: NEGATIVE
BUN SERPL-MCNC: 8 MG/DL (ref 5–25)
CALCIUM SERPL-MCNC: 9 MG/DL (ref 8.4–10.2)
CHLORIDE SERPL-SCNC: 106 MMOL/L (ref 96–108)
CLARITY UR: CLEAR
CO2 SERPL-SCNC: 27 MMOL/L (ref 21–32)
COLOR UR: ABNORMAL
CREAT SERPL-MCNC: 0.83 MG/DL (ref 0.6–1.3)
EOSINOPHIL # BLD MANUAL: 0 THOUSAND/UL (ref 0–0.4)
EOSINOPHIL NFR BLD MANUAL: 0 % (ref 0–6)
ERYTHROCYTE [DISTWIDTH] IN BLOOD BY AUTOMATED COUNT: 11.5 % (ref 11.6–15.1)
GFR SERPL CREATININE-BSD FRML MDRD: 126 ML/MIN/1.73SQ M
GLUCOSE SERPL-MCNC: 85 MG/DL (ref 65–140)
GLUCOSE UR STRIP-MCNC: NEGATIVE MG/DL
HCT VFR BLD AUTO: 38.6 % (ref 36.5–49.3)
HGB BLD-MCNC: 12.6 G/DL (ref 12–17)
HGB UR QL STRIP.AUTO: NEGATIVE
KETONES UR STRIP-MCNC: NEGATIVE MG/DL
LEUKOCYTE ESTERASE UR QL STRIP: ABNORMAL
LIPASE SERPL-CCNC: 14 U/L (ref 11–82)
LYMPHOCYTES # BLD AUTO: 1.83 THOUSAND/UL (ref 0.6–4.47)
LYMPHOCYTES # BLD AUTO: 34 % (ref 14–44)
MCH RBC QN AUTO: 29.1 PG (ref 26.8–34.3)
MCHC RBC AUTO-ENTMCNC: 32.6 G/DL (ref 31.4–37.4)
MCV RBC AUTO: 89 FL (ref 82–98)
MONOCYTES # BLD AUTO: 0.4 THOUSAND/UL (ref 0–1.22)
MONOCYTES NFR BLD: 8 % (ref 4–12)
NEUTROPHILS # BLD MANUAL: 2.72 THOUSAND/UL (ref 1.85–7.62)
NEUTS SEG NFR BLD AUTO: 55 % (ref 43–75)
NITRITE UR QL STRIP: NEGATIVE
NON-SQ EPI CELLS URNS QL MICRO: ABNORMAL /HPF
PH UR STRIP.AUTO: 6 [PH]
PLATELET # BLD AUTO: 258 THOUSANDS/UL (ref 149–390)
PLATELET BLD QL SMEAR: ADEQUATE
PMV BLD AUTO: 11.4 FL (ref 8.9–12.7)
POTASSIUM SERPL-SCNC: 4.1 MMOL/L (ref 3.5–5.3)
PROT SERPL-MCNC: 7 G/DL (ref 6.4–8.4)
PROT UR STRIP-MCNC: NEGATIVE MG/DL
RBC # BLD AUTO: 4.33 MILLION/UL (ref 3.88–5.62)
RBC #/AREA URNS AUTO: ABNORMAL /HPF
RBC MORPH BLD: NORMAL
SODIUM SERPL-SCNC: 138 MMOL/L (ref 135–147)
SP GR UR STRIP.AUTO: 1.02 (ref 1–1.03)
UROBILINOGEN UR STRIP-ACNC: <2 MG/DL
VARIANT LYMPHS # BLD AUTO: 3 %
WBC # BLD AUTO: 4.95 THOUSAND/UL (ref 4.31–10.16)
WBC #/AREA URNS AUTO: ABNORMAL /HPF

## 2025-02-03 PROCEDURE — 85007 BL SMEAR W/DIFF WBC COUNT: CPT | Performed by: EMERGENCY MEDICINE

## 2025-02-03 PROCEDURE — 81001 URINALYSIS AUTO W/SCOPE: CPT | Performed by: EMERGENCY MEDICINE

## 2025-02-03 PROCEDURE — 85027 COMPLETE CBC AUTOMATED: CPT | Performed by: EMERGENCY MEDICINE

## 2025-02-03 PROCEDURE — 36415 COLL VENOUS BLD VENIPUNCTURE: CPT | Performed by: EMERGENCY MEDICINE

## 2025-02-03 PROCEDURE — 80053 COMPREHEN METABOLIC PANEL: CPT | Performed by: EMERGENCY MEDICINE

## 2025-02-03 PROCEDURE — 87491 CHLMYD TRACH DNA AMP PROBE: CPT | Performed by: EMERGENCY MEDICINE

## 2025-02-03 PROCEDURE — 87086 URINE CULTURE/COLONY COUNT: CPT | Performed by: EMERGENCY MEDICINE

## 2025-02-03 PROCEDURE — 83690 ASSAY OF LIPASE: CPT | Performed by: EMERGENCY MEDICINE

## 2025-02-03 PROCEDURE — 87591 N.GONORRHOEAE DNA AMP PROB: CPT | Performed by: EMERGENCY MEDICINE

## 2025-02-03 PROCEDURE — 99284 EMERGENCY DEPT VISIT MOD MDM: CPT

## 2025-02-03 PROCEDURE — 96375 TX/PRO/DX INJ NEW DRUG ADDON: CPT

## 2025-02-03 PROCEDURE — 99285 EMERGENCY DEPT VISIT HI MDM: CPT | Performed by: EMERGENCY MEDICINE

## 2025-02-03 RX ORDER — KETOROLAC TROMETHAMINE 30 MG/ML
15 INJECTION, SOLUTION INTRAMUSCULAR; INTRAVENOUS ONCE
Status: COMPLETED | OUTPATIENT
Start: 2025-02-03 | End: 2025-02-03

## 2025-02-03 RX ADMIN — KETOROLAC TROMETHAMINE 15 MG: 30 INJECTION, SOLUTION INTRAMUSCULAR at 23:57

## 2025-02-03 NOTE — Clinical Note
Geoff Velez was seen and treated in our emergency department on 2/3/2025.                Diagnosis:     Geoff  .    He may return on this date: 02/05/2025         If you have any questions or concerns, please don't hesitate to call.      Deb Cueto    ______________________________           _______________          _______________  Hospital Representative                              Date                                Time

## 2025-02-04 ENCOUNTER — APPOINTMENT (EMERGENCY)
Dept: CT IMAGING | Facility: HOSPITAL | Age: 20
End: 2025-02-04
Payer: COMMERCIAL

## 2025-02-04 VITALS
TEMPERATURE: 98.3 F | DIASTOLIC BLOOD PRESSURE: 70 MMHG | HEART RATE: 62 BPM | OXYGEN SATURATION: 100 % | RESPIRATION RATE: 17 BRPM | SYSTOLIC BLOOD PRESSURE: 115 MMHG

## 2025-02-04 LAB
C TRACH DNA SPEC QL NAA+PROBE: NEGATIVE
N GONORRHOEA DNA SPEC QL NAA+PROBE: NEGATIVE

## 2025-02-04 PROCEDURE — 96365 THER/PROPH/DIAG IV INF INIT: CPT

## 2025-02-04 PROCEDURE — 74177 CT ABD & PELVIS W/CONTRAST: CPT

## 2025-02-04 RX ORDER — CEFDINIR 300 MG/1
300 CAPSULE ORAL EVERY 12 HOURS SCHEDULED
Qty: 14 CAPSULE | Refills: 0 | Status: SHIPPED | OUTPATIENT
Start: 2025-02-04 | End: 2025-02-11

## 2025-02-04 RX ADMIN — IOHEXOL 100 ML: 350 INJECTION, SOLUTION INTRAVENOUS at 01:07

## 2025-02-04 RX ADMIN — CEFTRIAXONE SODIUM 1000 MG: 10 INJECTION, POWDER, FOR SOLUTION INTRAVENOUS at 01:13

## 2025-02-04 NOTE — DISCHARGE INSTRUCTIONS
CT IMPRESSION:     No evidence for bowel obstruction, inflammation, appendicitis, obstructive uropathy, or free air. Trace lower pelvic ascites which is pathologic in a young male patient of uncertain etiology. Findings appear similar compared to the prior exam.

## 2025-02-04 NOTE — ED PROVIDER NOTES
"Time reflects when diagnosis was documented in both MDM as applicable and the Disposition within this note       Time User Action Codes Description Comment    2/4/2025  2:58 AM Irwin Cristobal Add [R10.9] Abdominal pain     2/4/2025  3:00 AM Irwin Cristobal Add [R82.90] Abnormal urinalysis           ED Disposition       ED Disposition   Discharge    Condition   Stable    Date/Time   Tue Feb 4, 2025  2:58 AM    Comment   Geoff Velez discharge to home/self care.                   Assessment & Plan       Medical Decision Making  19 y.o. male presents with a chief complaint of abdominal pain after recent visit last week and states \"I feel like it hasn't gotten any better.\"    Patient affirms 2 weeks of suprapubic abdominal pain without radiation.   Patient describes sharp pain that came on gradually, has been waxing and waning and continues in the ER.      Patient affirms fatigue.    Patient denies vomiting.    Patient affirms diarrhea.  Patient denies urinary symptoms.  Patient denies testicular pain or penile discharge.    Patient denies contacts with similar symptoms.      Patient denies any recent use of antibiotics, international travel, or trauma.    Focused Objective Exam:  Abdomen:  Inspection of an abdomen without previous abdominal surgical incisions noted without erythema, rashes or ecchymosis noted.  No abdominal pulsations noted.  Normal bowel sounds with no bruit auscultated.  Soft abdomen.  Palpitation noted tenderness in the right lower quadrant with lesser tenderness on the left lower quadrant; tenderness noted over McBurney's point.  No masses or pulsatile aorta noted on examination.  No rebound or guarding noted on examination, non-peritoneal exam.    Back:  No rash or signs of herpes zoster.  No CVA tenderness noted.   Skin:  No ecchymosis of the umbilicus (negative Salas's sign) or flank (negative Betancourt Tenorio's sign). Warm and dry.    Medical Decision Making    Abdominal pain with a broad " differential.  Will establish IV access and make patient NPO considering possibility of surgical intervention required.  Will initiate symptomatic management.    Review of the medical record including past visit including past CT imaging that did not demonstrate pelvic fluid but no clear etiology of his symptoms with normal appendix.    Differential is broad and includes significant likelihood of intra-abdominal pathology, including concerns for potential appendicitis as he does have pain directly over McBurney's point.   Could represent urologic etiology as he notes pain in the suprapubic region though the extent of the pain would raise concerns for urethritis.    Will obtain CBC to evaluate for anemia and leukocytosis.  Will obtain CMP to evaluate electrolytes, renal, biliary, and hepatic function.  Will obtain lipase to evaluate for potential pancreatitis.    Will obtain urinalysis to evaluate for possible urinary infectious sources and ketones to evaluate potential hydration state.    Based on patient's age, history, physical exam and presenting complaint, will evaluate need for imaging after initial evaluation is completed.                    Amount and/or Complexity of Data Reviewed  Labs: ordered.  Radiology: ordered.    Risk  Prescription drug management.        ED Course as of 02/05/25 0354   Tue Feb 04, 2025   0053 Patient has 20-30 WBCs but not significant bacteria.  Patient has persistent right lower quadrant pain but negative CT imaging previously.  Discussed risks and benefits of repeat imaging versus presumptive treatment for urethritis.  There is a dissociation between the 2 which does not carry increase the risk for appendicitis but had negative imaging previously.  After discussion with the patient, we will repeat imaging and treat presumptively for urethritis but validate no signs of appendicitis.   0300 Patient denies any concerns for STDs, GC testing sent.    On reassessment, patient notes  "patient symptoms have improved.  Unclear etiology of the leukocytes in the urine but will treat presumptively for possible urinary tract infection as he notes mainly suprapubic pain.  There is pain bilaterally in the lower quadrants is possible urethritis will treat as complicated particularly considering his gender and age.  I have treated patient with ceftriaxone but emphasized that if chlamydia testing is positive he would require return to the emergency room.  Urine culture has been sent.    CT imaging did not demonstrate any findings of appendicitis though it does have persistent pelvic fluid which I discussed with the patient the need for follow-up.  Patient has follow-up with primary care as scheduled and needs to see them prior to going to gastroenterology for which he was referred but cannot follow-up until primary care sees him.  Discussed and emphasized the need for this follow-up and return to the emergency with any progression or worsening in symptoms.       Medications   ketorolac (TORADOL) injection 15 mg (15 mg Intravenous Given 2/3/25 1205)   ceftriaxone (ROCEPHIN) 1 g/50 mL in dextrose IVPB (0 mg Intravenous Stopped 2/4/25 0143)   iohexol (OMNIPAQUE) 350 MG/ML injection (MULTI-DOSE) 100 mL (100 mL Intravenous Given 2/4/25 0107)       ED Risk Strat Scores            CRAFFT      Flowsheet Row Most Recent Value   CRAFFT Initial Screen: During the past 12 months, did you:    1. Drink any alcohol (more than a few sips)?  No Filed at: 02/03/2025 9425   2. Smoke any marijuana or hashish No Filed at: 02/03/2025 3253   3. Use anything else to get high? (\"anything else\" includes illegal drugs, over the counter and prescription drugs, and things that you sniff or 'victoria')? No Filed at: 02/03/2025 8857                                          History of Present Illness       Chief Complaint   Patient presents with    Abdominal Pain     Pt states being seen here last week for lower abd pain, but fatigue has " gotten worse. Denies n/v       Past Medical History:   Diagnosis Date    Asthma       History reviewed. No pertinent surgical history.   Family History   Problem Relation Age of Onset    Asthma Mother     Asthma Father       Social History     Tobacco Use    Smoking status: Never    Smokeless tobacco: Never   Vaping Use    Vaping status: Every Day    Substances: Nicotine   Substance Use Topics    Alcohol use: Yes    Drug use: Yes     Types: Marijuana      E-Cigarette/Vaping    E-Cigarette Use Current Every Day User     Comments vape       E-Cigarette/Vaping Substances    Nicotine Yes       I have reviewed and agree with the history as documented.     HPI    Review of Systems        Objective       ED Triage Vitals   Temperature Pulse Blood Pressure Respirations SpO2 Patient Position - Orthostatic VS   02/03/25 2139 02/03/25 2139 02/03/25 2139 02/03/25 2139 02/03/25 2139 02/03/25 2139   98.3 °F (36.8 °C) 99 137/72 18 99 % Sitting      Temp Source Heart Rate Source BP Location FiO2 (%) Pain Score    02/03/25 2139 02/03/25 2139 02/03/25 2139 -- 02/03/25 2357    Temporal Monitor Left arm  7      Vitals      Date and Time Temp Pulse SpO2 Resp BP Pain Score FACES Pain Rating User   02/04/25 0306 -- 62 100 % 17 115/70 -- -- CZ   02/04/25 0001 -- 81 98 % 19 127/59 -- -- CZ   02/03/25 2357 -- -- -- -- -- 7 -- CZ   02/03/25 2139 98.3 °F (36.8 °C) 99 99 % 18 137/72 -- -- KW            Physical Exam    Results Reviewed       Procedure Component Value Units Date/Time    Chlamydia/GC amplified DNA by PCR [[id number]]  (Normal) Collected: 02/03/25 2317    Lab Status: Final result Specimen: Urine, Other Updated: 02/04/25 1411     N gonorrhoeae, DNA Probe Negative     Chlamydia trachomatis, DNA Probe Negative    Narrative:      This test was performed using the FDA-approved Sharri 6800 CT/NG assay ([employer]). This test uses real-time PCR to detect Chlamydia trachomatis (CT) and Neisseria gonorrhoeae (NG). This instrument and  assay have been validated by the  and performing laboratory and verified by the performing laboratory.  This test is intended as an aid in the diagnosis of chlamydial and gonococcal disease. This test has not been evaluated in patients younger than 14 years of age and is not recommended for evaluation of suspected sexual abuse. This assay is not intended to replace other exams or tests for diagnosis of urogenital infection by causative factors other than Chalmydia trachomatis (CT) and Neisseria gonorrhoeae (NG). Additional testing is recommended when the results do not correlate with clinical signs and symptoms.   Procedural Limitations  This assay has only been validated for use with male and female urine, clinician-instructed self-collected vaginal swab specimens, clinician-collected vaginal swab specimens, endocervical swab specimens collected in marcell® PCR Media and cervical specimens collected in PreservCyt® Solution. Assay performance has not been validated for use with other collection media and/or specimen types.   Detection of C. trachomatis and N. gonorrhoeaea is dependent on the number of organisms present in the specimen. Detection may be affected by specimen collection methods, patient factors, stage of infection, infecting strains, and presence of polymerase/PCR inhibitors.   When CT is present at very high concentrations, the detection of NG present at concentrations near the limit of detection may be impacted.  The presence of mucus in endocervical specimens may lead to false negative results.  The presence of whole blood in urine and cervical specimens collected in PreservCyt Solution may lead to false negative and/or invalid test results.   Urine testing is recommended to be performed on first catch urine samples. The effects of other collection variables have not been evaluated at this time. The effects of vaginal discharge, tampon use, douching, and other collection variables have not  been evaluated at this time.   This assay has not been evaluated with patients currently being treated with antimicrobial agents active against CT or NG, or patients with a history of hysterectomy.     RBC Morphology Reflex Test [[id number]] Collected: 02/03/25 2237    Lab Status: Final result Specimen: Blood from Arm, Right Updated: 02/04/25 0001    Urine Microscopic [[id number]]  (Abnormal) Collected: 02/03/25 2318    Lab Status: Final result Specimen: Urine, Clean Catch Updated: 02/03/25 2328     RBC, UA 1-2 /hpf      WBC, UA 20-30 /hpf      Epithelial Cells Occasional /hpf      Bacteria, UA Occasional /hpf     Urine culture [[id number]] Collected: 02/03/25 2318    Lab Status: In process Specimen: Urine, Clean Catch Updated: 02/03/25 2328    UA w Reflex to Microscopic w Reflex to Culture [[id number]]  (Abnormal) Collected: 02/03/25 2318    Lab Status: Final result Specimen: Urine, Clean Catch Updated: 02/03/25 2327     Color, UA Light Yellow     Clarity, UA Clear     Specific Gravity, UA 1.019     pH, UA 6.0     Leukocytes, UA Moderate     Nitrite, UA Negative     Protein, UA Negative mg/dl      Glucose, UA Negative mg/dl      Ketones, UA Negative mg/dl      Urobilinogen, UA <2.0 mg/dl      Bilirubin, UA Negative     Occult Blood, UA Negative    CBC and differential [[id number]]  (Abnormal) Collected: 02/03/25 2237    Lab Status: Final result Specimen: Blood from Arm, Right Updated: 02/03/25 2312     WBC 4.95 Thousand/uL      RBC 4.33 Million/uL      Hemoglobin 12.6 g/dL      Hematocrit 38.6 %      MCV 89 fL      MCH 29.1 pg      MCHC 32.6 g/dL      RDW 11.5 %      MPV 11.4 fL      Platelets 258 Thousands/uL     Narrative:      This is an appended report.  These results have been appended to a previously verified report.    Manual Differential(PHLEBS Do Not Order) [[id number]]  (Abnormal) Collected: 02/03/25 2237    Lab Status: Final result Specimen: Blood from Arm, Right Updated: 02/03/25 2312     Segmented % 55 %       Lymphocytes % 34 %      Monocytes % 8 %      Eosinophils % 0 %      Basophils % 0 %      Atypical Lymphocytes % 3 %      Absolute Neutrophils 2.72 Thousand/uL      Absolute Lymphocytes 1.83 Thousand/uL      Absolute Monocytes 0.40 Thousand/uL      Absolute Eosinophils 0.00 Thousand/uL      Absolute Basophils 0.00 Thousand/uL      Total Counted --     RBC Morphology Normal     Platelet Estimate Adequate    CMP [[id number]] Collected: 02/03/25 2237    Lab Status: Final result Specimen: Blood from Arm, Right Updated: 02/03/25 2258     Sodium 138 mmol/L      Potassium 4.1 mmol/L      Chloride 106 mmol/L      CO2 27 mmol/L      ANION GAP 5 mmol/L      BUN 8 mg/dL      Creatinine 0.83 mg/dL      Glucose 85 mg/dL      Calcium 9.0 mg/dL      AST 19 U/L      ALT 18 U/L      Alkaline Phosphatase 47 U/L      Total Protein 7.0 g/dL      Albumin 4.3 g/dL      Total Bilirubin 0.68 mg/dL      eGFR 126 ml/min/1.73sq m     Narrative:      National Kidney Disease Foundation guidelines for Chronic Kidney Disease (CKD):     Stage 1 with normal or high GFR (GFR > 90 mL/min/1.73 square meters)    Stage 2 Mild CKD (GFR = 60-89 mL/min/1.73 square meters)    Stage 3A Moderate CKD (GFR = 45-59 mL/min/1.73 square meters)    Stage 3B Moderate CKD (GFR = 30-44 mL/min/1.73 square meters)    Stage 4 Severe CKD (GFR = 15-29 mL/min/1.73 square meters)    Stage 5 End Stage CKD (GFR <15 mL/min/1.73 square meters)  Note: GFR calculation is accurate only with a steady state creatinine    Lipase [[id number]]  (Normal) Collected: 02/03/25 2237    Lab Status: Final result Specimen: Blood from Arm, Right Updated: 02/03/25 2258     Lipase 14 u/L             CT abdomen pelvis with contrast   Final Interpretation by Cholo Hernandez MD (02/04 0224)      No evidence for bowel obstruction, inflammation, appendicitis, obstructive uropathy, or free air. Trace lower pelvic ascites which is pathologic in a young male patient of uncertain etiology. Findings appear  similar compared to the prior exam.         Workstation performed: [id number]             Procedures    ED Medication and Procedure Management   Prior to Admission Medications   Prescriptions Last Dose Informant Patient Reported? Taking?   Cetirizine HCl 10 MG CAPS   No No   Sig: Take 1 capsule (10 mg total) by mouth daily   albuterol (PROVENTIL HFA,VENTOLIN HFA) 90 mcg/act inhaler   Yes No   Sig: Inhale 2 puffs every 6 (six) hours as needed for wheezing   albuterol (ProAir HFA) 90 mcg/act inhaler   No No   Sig: Inhale 2 puffs every 6 (six) hours as needed for wheezing   albuterol (ProAir HFA) 90 mcg/act inhaler   No No   Sig: Inhale 2 puffs every 6 (six) hours as needed for shortness of breath or wheezing   azelastine (OPTIVAR) 0.05 % ophthalmic solution   No No   Sig: Apply 1 drop to eye 2 (two) times a day as needed (itchy eyes)   fluticasone (FLONASE) 50 mcg/act nasal spray   No No   Si sprays into each nostril daily   loratadine (CLARITIN) 10 mg tablet   No No   Sig: Take 1 tablet (10 mg total) by mouth daily   neomycin-polymyxin-hydrocortisone (CORTISPORIN) 0.35%-10,000 units/mL-1% ophthalmic suspension   No No   Sig: Administer 1 drop to both eyes 3 (three) times a day for 7 days      Facility-Administered Medications: None     Discharge Medication List as of 2025  3:00 AM        START taking these medications    Details   cefdinir (OMNICEF) 300 mg capsule Take 1 capsule (300 mg total) by mouth every 12 (twelve) hours for 7 days, Starting 2025, Until 2025, Print           CONTINUE these medications which have NOT CHANGED    Details   !! albuterol (ProAir HFA) 90 mcg/act inhaler Inhale 2 puffs every 6 (six) hours as needed for wheezing, Starting Wed 2023, Normal      !! albuterol (ProAir HFA) 90 mcg/act inhaler Inhale 2 puffs every 6 (six) hours as needed for shortness of breath or wheezing, Starting Thu 10/3/2024, Normal      !! albuterol (PROVENTIL HFA,VENTOLIN HFA) 90 mcg/act  inhaler Inhale 2 puffs every 6 (six) hours as needed for wheezing, Historical Med      azelastine (OPTIVAR) 0.05 % ophthalmic solution Apply 1 drop to eye 2 (two) times a day as needed (itchy eyes), Starting u 5/9/2024, Normal      Cetirizine HCl 10 MG CAPS Take 1 capsule (10 mg total) by mouth daily, Starting u 4/13/2023, Normal      fluticasone (FLONASE) 50 mcg/act nasal spray 2 sprays into each nostril daily, Starting u 5/9/2024, Normal      loratadine (CLARITIN) 10 mg tablet Take 1 tablet (10 mg total) by mouth daily, Starting u 4/13/2023, Normal      neomycin-polymyxin-hydrocortisone (CORTISPORIN) 0.35%-10,000 units/mL-1% ophthalmic suspension Administer 1 drop to both eyes 3 (three) times a day for 7 days, Starting u 4/13/2023, Until u 4/20/2023, Normal       !! - Potential duplicate medications found. Please discuss with provider.        No discharge procedures on file.  ED SEPSIS DOCUMENTATION   Time reflects when diagnosis was documented in both MDM as applicable and the Disposition within this note       Time User Action Codes Description Comment    2/4/2025  2:58 AM Irwin Cristobal [R10.9] Abdominal pain     2/4/2025  3:00 AM Irwin Cristobal [R82.90] Abnormal urinalysis                  Irwin Cristobal MD  02/05/25 0354

## 2025-02-05 ENCOUNTER — APPOINTMENT (OUTPATIENT)
Dept: LAB | Facility: CLINIC | Age: 20
End: 2025-02-05
Payer: COMMERCIAL

## 2025-02-05 ENCOUNTER — OFFICE VISIT (OUTPATIENT)
Dept: GASTROENTEROLOGY | Facility: CLINIC | Age: 20
End: 2025-02-05
Payer: COMMERCIAL

## 2025-02-05 ENCOUNTER — PREP FOR PROCEDURE (OUTPATIENT)
Dept: GASTROENTEROLOGY | Facility: CLINIC | Age: 20
End: 2025-02-05

## 2025-02-05 VITALS
DIASTOLIC BLOOD PRESSURE: 74 MMHG | HEART RATE: 76 BPM | HEIGHT: 69 IN | WEIGHT: 153 LBS | BODY MASS INDEX: 22.66 KG/M2 | OXYGEN SATURATION: 99 % | SYSTOLIC BLOOD PRESSURE: 120 MMHG | TEMPERATURE: 97.5 F

## 2025-02-05 DIAGNOSIS — R19.8 CHANGE IN BOWEL FUNCTION: Primary | ICD-10-CM

## 2025-02-05 DIAGNOSIS — R10.11 RUQ PAIN: ICD-10-CM

## 2025-02-05 DIAGNOSIS — R19.8 CHANGE IN BOWEL FUNCTION: ICD-10-CM

## 2025-02-05 DIAGNOSIS — R10.9 ABDOMINAL PAIN: ICD-10-CM

## 2025-02-05 DIAGNOSIS — R10.31 RIGHT LOWER QUADRANT ABDOMINAL PAIN: Primary | ICD-10-CM

## 2025-02-05 LAB — BACTERIA UR CULT: NORMAL

## 2025-02-05 PROCEDURE — 82728 ASSAY OF FERRITIN: CPT

## 2025-02-05 PROCEDURE — 83540 ASSAY OF IRON: CPT

## 2025-02-05 PROCEDURE — 36415 COLL VENOUS BLD VENIPUNCTURE: CPT

## 2025-02-05 PROCEDURE — 83550 IRON BINDING TEST: CPT

## 2025-02-05 PROCEDURE — 99203 OFFICE O/P NEW LOW 30 MIN: CPT | Performed by: PHYSICIAN ASSISTANT

## 2025-02-05 PROCEDURE — 86140 C-REACTIVE PROTEIN: CPT

## 2025-02-05 PROCEDURE — 85652 RBC SED RATE AUTOMATED: CPT

## 2025-02-05 NOTE — PATIENT INSTRUCTIONS
Scheduled date of colonoscopy (as of today):2/10/25  Physician performing colonoscopy:Kody  Location of colonoscopy:[facility]  Bowel prep reviewed with patient:Ferny/Miralax  Instructions reviewed with patient by: Robbi bennett  Clearances:  none

## 2025-02-05 NOTE — PROGRESS NOTES
Name: Geoff Velez      : 2005      MRN: [id number]  Encounter Provider: Ale Rojo PA-C  Encounter Date: 2025   Encounter department: [facility] [place]  :  Assessment & Plan  Abdominal pain      RUQ pain      Change in bowel function  Patient presents to the GI clinic today with a chief complaint of abdominal pain, change in bowel habits, fatigue.    Will update ESR, CRP, iron panel.    Will plan colonoscopy with biopsy and intubation of the terminal ileum.    Further recommendations in regard to medications will be given after colonoscopy is complete.    Discussed dietary modifications.    Patient's mom accompanies patient in the office today and agrees with plan.        History of Present Illness   HPI  Geoff Velez is a 19 y.o. male who presents to the GI clinic today with a chief complaint of change in bowel habits, abdominal pain, fatigue.  Patient reports that for 2 to 3 weeks he has been suffering with ongoing symptoms in regard to lower abdominal pain, increasing fatigue, and diarrhea.  Patient reports that he is having 3-4 liquid bowel movements a day with no associated rectal bleeding or melena.  He reports that he did try to change his diet with no significant benefit in symptoms.  Patient is reporting significant fatigue but his weight is stable.  He reports that his pain is generally located around his bellybutton but does radiate to the right lower quadrant.  He denies any significant heartburn symptoms.  He denies any dysphagia.  He denies any family history of inflammatory bowel disease.  Unfortunately he has been in the emergency department twice within the last week and a half.  Patient did have evidence of fluid in his pelvis.      Review of Systems   Constitutional:  Negative for chills and fever.   HENT:  Negative for ear pain and sore throat.    Eyes:  Negative for pain and visual disturbance.   Respiratory:  Negative for cough and  "shortness of breath.    Cardiovascular:  Negative for chest pain and palpitations.   Gastrointestinal:  Negative for abdominal pain and vomiting.   Genitourinary:  Negative for dysuria and hematuria.   Musculoskeletal:  Negative for arthralgias and back pain.   Skin:  Negative for color change and rash.   Neurological:  Negative for seizures and syncope.   All other systems reviewed and are negative.         Objective   /74 (BP Location: Right arm, Patient Position: Sitting, Cuff Size: Standard)   Pulse 76   Temp 97.5 °F (36.4 °C) (Temporal)   Ht 5' 9\" (1.753 m)   Wt 69.4 kg (153 lb)   SpO2 99%   BMI 22.59 kg/m²      Physical Exam  Vitals and nursing note reviewed.   Constitutional:       General: He is not in acute distress.     Appearance: He is well-developed.   HENT:      Head: Normocephalic and atraumatic.   Eyes:      Conjunctiva/sclera: Conjunctivae normal.   Cardiovascular:      Rate and Rhythm: Normal rate and regular rhythm.      Heart sounds: No murmur heard.  Pulmonary:      Effort: Pulmonary effort is normal. No respiratory distress.      Breath sounds: Normal breath sounds.   Abdominal:      Palpations: Abdomen is soft.      Tenderness: There is no abdominal tenderness.   Musculoskeletal:         General: No swelling.      Cervical back: Neck supple.   Skin:     General: Skin is warm and dry.      Capillary Refill: Capillary refill takes less than 2 seconds.   Neurological:      Mental Status: He is alert.   Psychiatric:         Mood and Affect: Mood normal.           "

## 2025-02-06 LAB
CRP SERPL QL: 1.3 MG/L
ERYTHROCYTE [SEDIMENTATION RATE] IN BLOOD: 7 MM/HOUR (ref 0–14)
FERRITIN SERPL-MCNC: 67 NG/ML (ref 24–336)
IRON SATN MFR SERPL: 33 % (ref 15–50)
IRON SERPL-MCNC: 113 UG/DL (ref 50–212)
TIBC SERPL-MCNC: 338.8 UG/DL (ref 250–450)
TRANSFERRIN SERPL-MCNC: 242 MG/DL (ref 203–362)
UIBC SERPL-MCNC: 226 UG/DL (ref 155–355)

## 2025-02-07 ENCOUNTER — RESULTS FOLLOW-UP (OUTPATIENT)
Dept: GASTROENTEROLOGY | Facility: CLINIC | Age: 20
End: 2025-02-07

## 2025-02-10 ENCOUNTER — HOSPITAL ENCOUNTER (OUTPATIENT)
Dept: GASTROENTEROLOGY | Facility: HOSPITAL | Age: 20
Setting detail: OUTPATIENT SURGERY
Discharge: HOME | End: 2025-02-10
Attending: INTERNAL MEDICINE
Payer: COMMERCIAL

## 2025-02-10 ENCOUNTER — ANESTHESIA (OUTPATIENT)
Dept: GASTROENTEROLOGY | Facility: HOSPITAL | Age: 20
End: 2025-02-10
Payer: COMMERCIAL

## 2025-02-10 ENCOUNTER — ANESTHESIA EVENT (OUTPATIENT)
Dept: GASTROENTEROLOGY | Facility: HOSPITAL | Age: 20
End: 2025-02-10
Payer: COMMERCIAL

## 2025-02-10 VITALS
BODY MASS INDEX: 22.53 KG/M2 | SYSTOLIC BLOOD PRESSURE: 120 MMHG | HEIGHT: 69 IN | DIASTOLIC BLOOD PRESSURE: 71 MMHG | TEMPERATURE: 97.3 F | RESPIRATION RATE: 22 BRPM | OXYGEN SATURATION: 100 % | WEIGHT: 152.12 LBS | HEART RATE: 63 BPM

## 2025-02-10 DIAGNOSIS — R10.31 RIGHT LOWER QUADRANT ABDOMINAL PAIN: ICD-10-CM

## 2025-02-10 DIAGNOSIS — R10.11 RUQ PAIN: ICD-10-CM

## 2025-02-10 PROCEDURE — 45385 COLONOSCOPY W/LESION REMOVAL: CPT | Performed by: INTERNAL MEDICINE

## 2025-02-10 PROCEDURE — 88305 TISSUE EXAM BY PATHOLOGIST: CPT | Performed by: STUDENT IN AN ORGANIZED HEALTH CARE EDUCATION/TRAINING PROGRAM

## 2025-02-10 RX ORDER — LIDOCAINE HYDROCHLORIDE 20 MG/ML
INJECTION, SOLUTION EPIDURAL; INFILTRATION; INTRACAUDAL; PERINEURAL AS NEEDED
Status: DISCONTINUED | OUTPATIENT
Start: 2025-02-10 | End: 2025-02-10

## 2025-02-10 RX ORDER — SODIUM CHLORIDE, SODIUM LACTATE, POTASSIUM CHLORIDE, CALCIUM CHLORIDE 600; 310; 30; 20 MG/100ML; MG/100ML; MG/100ML; MG/100ML
20 INJECTION, SOLUTION INTRAVENOUS CONTINUOUS
Status: CANCELLED | OUTPATIENT
Start: 2025-02-10

## 2025-02-10 RX ORDER — PROPOFOL 10 MG/ML
INJECTION, EMULSION INTRAVENOUS AS NEEDED
Status: DISCONTINUED | OUTPATIENT
Start: 2025-02-10 | End: 2025-02-10

## 2025-02-10 RX ORDER — ONDANSETRON 2 MG/ML
4 INJECTION INTRAMUSCULAR; INTRAVENOUS ONCE AS NEEDED
Status: CANCELLED | OUTPATIENT
Start: 2025-02-10

## 2025-02-10 RX ORDER — SODIUM CHLORIDE, SODIUM LACTATE, POTASSIUM CHLORIDE, CALCIUM CHLORIDE 600; 310; 30; 20 MG/100ML; MG/100ML; MG/100ML; MG/100ML
INJECTION, SOLUTION INTRAVENOUS CONTINUOUS PRN
Status: DISCONTINUED | OUTPATIENT
Start: 2025-02-10 | End: 2025-02-10

## 2025-02-10 RX ADMIN — PROPOFOL 60 MG: 10 INJECTION, EMULSION INTRAVENOUS at 07:54

## 2025-02-10 RX ADMIN — PROPOFOL 40 MG: 10 INJECTION, EMULSION INTRAVENOUS at 08:00

## 2025-02-10 RX ADMIN — PROPOFOL 40 MG: 10 INJECTION, EMULSION INTRAVENOUS at 08:03

## 2025-02-10 RX ADMIN — PROPOFOL 40 MG: 10 INJECTION, EMULSION INTRAVENOUS at 08:13

## 2025-02-10 RX ADMIN — PROPOFOL 50 MG: 10 INJECTION, EMULSION INTRAVENOUS at 07:55

## 2025-02-10 RX ADMIN — PROPOFOL 40 MG: 10 INJECTION, EMULSION INTRAVENOUS at 08:06

## 2025-02-10 RX ADMIN — SODIUM CHLORIDE, SODIUM LACTATE, POTASSIUM CHLORIDE, AND CALCIUM CHLORIDE: .6; .31; .03; .02 INJECTION, SOLUTION INTRAVENOUS at 07:46

## 2025-02-10 RX ADMIN — LIDOCAINE HYDROCHLORIDE 60 MG: 20 INJECTION, SOLUTION EPIDURAL; INFILTRATION; INTRACAUDAL; PERINEURAL at 07:53

## 2025-02-10 RX ADMIN — PROPOFOL 90 MG: 10 INJECTION, EMULSION INTRAVENOUS at 07:53

## 2025-02-10 RX ADMIN — PROPOFOL 30 MG: 10 INJECTION, EMULSION INTRAVENOUS at 07:58

## 2025-02-10 RX ADMIN — PROPOFOL 40 MG: 10 INJECTION, EMULSION INTRAVENOUS at 08:09

## 2025-02-10 NOTE — ANESTHESIA PREPROCEDURE EVALUATION
Procedure:  COLONOSCOPY    Relevant Problems   PULMONARY   (+) Asthma exacerbation   (+) Mild intermittent asthma without complication    No db use in > 2 months  Physical Exam    Airway    Mallampati score: II  TM Distance: >3 FB  Neck ROM: full     Dental       Cardiovascular  Cardiovascular exam normal    Pulmonary  Pulmonary exam normal     Other Findings        Anesthesia Plan  ASA Score- 2     Anesthesia Type- IV sedation with anesthesia with ASA Monitors.         Additional Monitors:     Airway Plan:            Plan Factors-Exercise tolerance (METS): >4 METS.    Chart reviewed. EKG reviewed. Imaging results reviewed. Existing labs reviewed. Patient summary reviewed.    Patient is not a current smoker.              Induction- intravenous.    Postoperative Plan-         Informed Consent- Anesthetic plan and risks discussed with patient.  I personally reviewed this patient with the CRNA. Discussed and agreed on the Anesthesia Plan with the CRNA..      NPO Status:  Vitals Value Taken Time   Date of last liquid 02/10/25 02/10/25 0659   Time of last liquid 0400 02/10/25 0659   Date of last solid 03/08/25 02/10/25 0659   Time of last solid 1800 02/10/25 0659

## 2025-02-10 NOTE — ANESTHESIA POSTPROCEDURE EVALUATION
Post-Op Assessment Note    CV Status:  Stable    Pain management: adequate       Mental Status:  Arousable   PONV Controlled:  None   Airway Patency:  Patent     Post Op Vitals Reviewed: Yes    No anethesia notable event occurred.    Staff: Anesthesiologist, CRNA           Last Filed PACU Vitals:  Vitals Value Taken Time   Temp 97.3 °F (36.3 °C) 02/10/25 0818   Pulse 80 02/10/25 0818   /57 02/10/25 0818   Resp 24 02/10/25 0818   SpO2 100 % 02/10/25 0818       Modified Isha:     Vitals Value Taken Time   Activity 2 02/10/25 0818   Respiration 2 02/10/25 0818   Circulation 2 02/10/25 0818   Consciousness 2 02/10/25 0818   Oxygen Saturation 2 02/10/25 0818     Modified Isha Score: 10

## 2025-02-10 NOTE — H&P
"History and Physical -  [facility]  Geoff Velez 19 y.o. male MRN: [id number]      HPI: Geoff Velez is a 19 y.o. year old male who presents for change in bowel habits      REVIEW OF SYSTEMS: Per the HPI, and otherwise unremarkable.    Historical Information   Past Medical History:   Diagnosis Date    Asthma      No past surgical history on file.  Social History   Social History     Substance and Sexual Activity   Alcohol Use Yes     Social History     Substance and Sexual Activity   Drug Use Yes    Types: Marijuana     Social History     Tobacco Use   Smoking Status Never   Smokeless Tobacco Never     Family History   Problem Relation Age of Onset    Asthma Mother     Asthma Father        Meds/Allergies     Not in a hospital admission.    No Known Allergies    Objective     Blood pressure 141/81, pulse 57, resp. rate 16, height 5' 9\" (1.753 m), weight 69 kg (152 lb 1.9 oz), SpO2 100%.      PHYSICAL EXAM    Gen: NAD  CV: RRR  CHEST: Clear  ABD: soft, NT/ND  EXT: no edema      ASSESSMENT/PLAN:  This is a 19 y.o. year old male here for colonoscopy, and he is stable and optimized for his procedure.          "

## 2025-02-17 ENCOUNTER — RESULTS FOLLOW-UP (OUTPATIENT)
Dept: GASTROENTEROLOGY | Facility: CLINIC | Age: 20
End: 2025-02-17

## 2025-02-17 PROCEDURE — 88305 TISSUE EXAM BY PATHOLOGIST: CPT | Performed by: STUDENT IN AN ORGANIZED HEALTH CARE EDUCATION/TRAINING PROGRAM

## 2025-02-24 NOTE — TELEPHONE ENCOUNTER
----- Message from Reno Gates DO sent at 2/23/2025 10:54 AM EST -----  Patient has not read [email or web address] message. Please call and share results.    Geoff,     The biopsies taken of the terminal ileum were unremarkable.  There was no evidence of Crohn disease.  The polyp of the rectum was a benign inflammatory polyp.

## 2025-02-24 NOTE — TELEPHONE ENCOUNTER
Called pt and spoke with pt' mom ALEKSANDRA not on consent and advised to let son know that his results of Colonoscopy is on MyChart.  ALEKSANDRA voiced understanding and stated that his son will look at the results.

## 2025-07-04 ENCOUNTER — HOSPITAL ENCOUNTER (EMERGENCY)
Facility: HOSPITAL | Age: 20
Discharge: HOME | End: 2025-07-04
Attending: EMERGENCY MEDICINE
Payer: COMMERCIAL

## 2025-07-04 VITALS
WEIGHT: 167.55 LBS | HEIGHT: 69 IN | RESPIRATION RATE: 18 BRPM | SYSTOLIC BLOOD PRESSURE: 117 MMHG | HEART RATE: 60 BPM | BODY MASS INDEX: 24.82 KG/M2 | TEMPERATURE: 98 F | DIASTOLIC BLOOD PRESSURE: 78 MMHG | OXYGEN SATURATION: 96 %

## 2025-07-04 DIAGNOSIS — K04.7 DENTAL INFECTION: ICD-10-CM

## 2025-07-04 DIAGNOSIS — R51.9 HEADACHE: ICD-10-CM

## 2025-07-04 DIAGNOSIS — K08.89 DENTALGIA: Primary | ICD-10-CM

## 2025-07-04 DIAGNOSIS — K02.9 DENTAL CARIES: ICD-10-CM

## 2025-07-04 PROCEDURE — 99284 EMERGENCY DEPT VISIT MOD MDM: CPT

## 2025-07-04 PROCEDURE — 96372 THER/PROPH/DIAG INJ SC/IM: CPT

## 2025-07-04 PROCEDURE — 99282 EMERGENCY DEPT VISIT SF MDM: CPT

## 2025-07-04 RX ORDER — LIDOCAINE HYDROCHLORIDE 20 MG/ML
15 SOLUTION OROPHARYNGEAL ONCE
Status: COMPLETED | OUTPATIENT
Start: 2025-07-04 | End: 2025-07-04

## 2025-07-04 RX ORDER — IBUPROFEN 800 MG/1
800 TABLET, FILM COATED ORAL 3 TIMES DAILY
Qty: 21 TABLET | Refills: 0 | Status: SHIPPED | OUTPATIENT
Start: 2025-07-04

## 2025-07-04 RX ORDER — KETOROLAC TROMETHAMINE 30 MG/ML
15 INJECTION, SOLUTION INTRAMUSCULAR; INTRAVENOUS ONCE
Status: COMPLETED | OUTPATIENT
Start: 2025-07-04 | End: 2025-07-04

## 2025-07-04 RX ORDER — ACETAMINOPHEN 325 MG/1
975 TABLET ORAL ONCE
Status: COMPLETED | OUTPATIENT
Start: 2025-07-04 | End: 2025-07-04

## 2025-07-04 RX ADMIN — KETOROLAC TROMETHAMINE 15 MG: 30 INJECTION, SOLUTION INTRAMUSCULAR at 17:44

## 2025-07-04 RX ADMIN — LIDOCAINE HYDROCHLORIDE 15 ML: 20 SOLUTION ORAL at 17:49

## 2025-07-04 RX ADMIN — AMOXICILLIN AND CLAVULANATE POTASSIUM 1 TABLET: 875; 125 TABLET, FILM COATED ORAL at 17:44

## 2025-07-04 RX ADMIN — ACETAMINOPHEN 975 MG: 325 TABLET ORAL at 17:43

## 2025-07-04 NOTE — ED PROVIDER NOTES
Time reflects when diagnosis was documented in both MDM as applicable and the Disposition within this note       Time User Action Codes Description Comment    7/4/2025  5:40 PM Jasmin Phillip Add [K08.89] Dentalgia     7/4/2025  5:40 PM Jasmin Phillip Add [K02.9] Dental caries     7/4/2025  5:40 PM Davon Phillipimee Add [K04.7] Dental infection     7/4/2025  5:40 PM Jasmin Phillip Add [R51.9] Headache           ED Disposition       ED Disposition   Discharge    Condition   Stable    Date/Time   Fri Jul 4, 2025  5:40 PM    Comment   Geoff Velez discharge to home/self care.                   Assessment & Plan       Medical Decision Making  20 yr old male presents for dental pain due to suspected dental karyn. Patient not immunosuppressed, afebrile and well appearing with patent airway, have low suspicfion for deep space infection or any concern for airway compromise. Based on history, physical, and work up. No evidence of tooth fracture, avulsion, or bleeding socket. No evidence of RPA, PTA, Eldon's angina, periapical abscess. Will treat with Augmentin. Dose of Augmentin, ketorolac and tylenol given in ED. Viscous lidocaine also given in ED. Instructed patient to continue to treat pain with ibuprofen/acetaminophen until they see a dentist. Patient discharged home and will follow up with dentist. Discussed return precautions for odontogenic infections and other dental pain emergencies. Patient stable for discharge. Prior to discharge, discharge instructions were discussed with patient at bedside. Patient was provided both verbal and written instructions. Patient is understanding of the discharge instructions and is agreeable to plan of care. Return precautions were discussed with patient bedside, patient verbalized understanding of signs and symptoms that would necessitate return to the ED. All questions were answered. Patient was comfortable with the plan of care and discharged to home.        Risk  OTC  drugs.  Prescription drug management.             Medications   ketorolac (TORADOL) injection 15 mg (15 mg Intramuscular Given 7/4/25 1744)   acetaminophen (TYLENOL) tablet 975 mg (975 mg Oral Given 7/4/25 1743)   amoxicillin-clavulanate (AUGMENTIN) 875-125 mg per tablet 1 tablet (1 tablet Oral Given 7/4/25 1744)   Lidocaine Viscous HCl (XYLOCAINE) 2 % mucosal solution 15 mL (15 mL Swish & Spit Given 7/4/25 1749)       ED Risk Strat Scores                    No data recorded                            History of Present Illness       Chief Complaint   Patient presents with    Dental Pain     Pt complains of dental pain for a couple of days and is worried for an infection in bis tooth        Past Medical History[1]   Past Surgical History[2]   Family History[3]   Social History[4]   E-Cigarette/Vaping    E-Cigarette Use Current Every Day User     Comments vape       E-Cigarette/Vaping Substances    Nicotine Yes       I have reviewed and agree with the history as documented.     20 yr old male presents with right sided dental pain. Reports has been increasing in pain over the past few days. Reports associated headache as well. Denies vision changes, nausea, vomiting, inability to swallow, facial swelling, fever, chills, or any other symptoms.       Dental Pain  Associated symptoms: headaches    Associated symptoms: no fever        Review of Systems   Constitutional:  Negative for chills and fever.   HENT:  Positive for dental problem. Negative for ear pain and sore throat.    Eyes:  Negative for pain and visual disturbance.   Respiratory:  Negative for cough and shortness of breath.    Cardiovascular:  Negative for chest pain and palpitations.   Gastrointestinal:  Negative for abdominal pain and vomiting.   Genitourinary:  Negative for dysuria and hematuria.   Musculoskeletal:  Negative for arthralgias and back pain.   Skin:  Negative for color change and rash.   Neurological:  Positive for headaches. Negative for  seizures and syncope.   All other systems reviewed and are negative.          Objective       ED Triage Vitals   Temperature Pulse Blood Pressure Respirations SpO2 Patient Position - Orthostatic VS   07/04/25 1732 07/04/25 1732 07/04/25 1732 07/04/25 1732 07/04/25 1732 07/04/25 1732   98 °F (36.7 °C) 60 117/78 18 96 % Sitting      Temp Source Heart Rate Source BP Location FiO2 (%) Pain Score    07/04/25 1732 07/04/25 1732 -- -- 07/04/25 1743    Temporal Monitor   10 - Worst Possible Pain      Vitals      Date and Time Temp Pulse SpO2 Resp BP Pain Score FACES Pain Rating User   07/04/25 1744 -- -- -- -- -- 10 - Worst Possible Pain -- SM   07/04/25 1743 -- -- -- -- -- 10 - Worst Possible Pain -- SM   07/04/25 1732 98 °F (36.7 °C) 60 96 % 18 117/78 -- -- TO            Physical Exam  Vitals and nursing note reviewed.   Constitutional:       General: He is not in acute distress.     Appearance: He is well-developed.   HENT:      Head: Normocephalic and atraumatic.      Comments: No facial swelling     Mouth/Throat:      Lips: Pink.      Mouth: Mucous membranes are moist.      Dentition: Dental caries present. No gingival swelling or dental abscesses.      Pharynx: Oropharynx is clear.      Tonsils: No tonsillar exudate.       Eyes:      Conjunctiva/sclera: Conjunctivae normal.       Cardiovascular:      Rate and Rhythm: Normal rate and regular rhythm.      Heart sounds: No murmur heard.  Pulmonary:      Effort: Pulmonary effort is normal. No respiratory distress.      Breath sounds: Normal breath sounds.   Abdominal:      Palpations: Abdomen is soft.      Tenderness: There is no abdominal tenderness.     Musculoskeletal:         General: No swelling.      Cervical back: Neck supple.     Skin:     General: Skin is warm and dry.      Capillary Refill: Capillary refill takes less than 2 seconds.     Neurological:      General: No focal deficit present.      Mental Status: He is alert and oriented to person, place, and time.       Cranial Nerves: Cranial nerves 2-12 are intact. No facial asymmetry.      Sensory: Sensation is intact.      Motor: Motor function is intact.      Coordination: Coordination is intact.      Gait: Gait is intact.     Psychiatric:         Mood and Affect: Mood normal.         Results Reviewed       None            No orders to display       Procedures    ED Medication and Procedure Management   Prior to Admission Medications   Prescriptions Last Dose Informant Patient Reported? Taking?   Cetirizine HCl 10 MG CAPS  Self No No   Sig: Take 1 capsule (10 mg total) by mouth daily   albuterol (PROVENTIL HFA,VENTOLIN HFA) 90 mcg/act inhaler  Self Yes No   Sig: Inhale 2 puffs every 6 (six) hours as needed for wheezing   albuterol (ProAir HFA) 90 mcg/act inhaler  Self No No   Sig: Inhale 2 puffs every 6 (six) hours as needed for wheezing   albuterol (ProAir HFA) 90 mcg/act inhaler  Self No No   Sig: Inhale 2 puffs every 6 (six) hours as needed for shortness of breath or wheezing   azelastine (OPTIVAR) 0.05 % ophthalmic solution  Self No No   Sig: Apply 1 drop to eye 2 (two) times a day as needed (itchy eyes)   fluticasone (FLONASE) 50 mcg/act nasal spray  Self No No   Si sprays into each nostril daily   loratadine (CLARITIN) 10 mg tablet  Self No No   Sig: Take 1 tablet (10 mg total) by mouth daily   neomycin-polymyxin-hydrocortisone (CORTISPORIN) 0.35%-10,000 units/mL-1% ophthalmic suspension  Self No No   Sig: Administer 1 drop to both eyes 3 (three) times a day for 7 days      Facility-Administered Medications: None     Patient's Medications   Discharge Prescriptions    AMOXICILLIN-CLAVULANATE (AUGMENTIN) 875-125 MG PER TABLET    Take 1 tablet by mouth every 12 (twelve) hours for 10 days       Start Date: 2025  End Date: 2025       Order Dose: 1 tablet       Quantity: 20 tablet    Refills: 0    IBUPROFEN (MOTRIN) 800 MG TABLET    Take 1 tablet (800 mg total) by mouth 3 (three) times a day       Start Date:  7/4/2025  End Date: --       Order Dose: 800 mg       Quantity: 21 tablet    Refills: 0     No discharge procedures on file.  ED SEPSIS DOCUMENTATION   Time reflects when diagnosis was documented in both MDM as applicable and the Disposition within this note       Time User Action Codes Description Comment    7/4/2025  5:40 PM Jasmin Phillip [K08.89] Dentalgia     7/4/2025  5:40 PM Jasmin Phillip [K02.9] Dental caries     7/4/2025  5:40 PM Jasmin Phillip Add [K04.7] Dental infection     7/4/2025  5:40 PM Jasmin Phillip Add [R51.9] Headache                    [1]   Past Medical History:  Diagnosis Date    Asthma    [2] No past surgical history on file.  [3]   Family History  Problem Relation Name Age of Onset    Asthma Mother      Asthma Father     [4]   Social History  Tobacco Use    Smoking status: Never    Smokeless tobacco: Never   Vaping Use    Vaping status: Every Day    Substances: Nicotine   Substance Use Topics    Alcohol use: Yes    Drug use: Yes     Types: Marijuana        Jasmin Phillip PA-C  07/04/25 1754

## 2025-07-04 NOTE — DISCHARGE INSTRUCTIONS
You have been evaluated at the Emergency Room for dental pain. Your symptoms and physical exam are most consistent with dental infection.     Please take the full course of your antibiotic.    It is recommended to use Tylenol or Motrin for pain as needed.    Please follow up with a dentist.     Return to the ED if you experience fevers, chills, shortness of breath, difficulty breathing, chest pain, palpitations, new rash, or worsening pain.